# Patient Record
Sex: FEMALE | Race: OTHER | NOT HISPANIC OR LATINO | URBAN - METROPOLITAN AREA
[De-identification: names, ages, dates, MRNs, and addresses within clinical notes are randomized per-mention and may not be internally consistent; named-entity substitution may affect disease eponyms.]

---

## 2014-02-17 RX ORDER — INSULIN GLARGINE 100 [IU]/ML
20 INJECTION, SOLUTION SUBCUTANEOUS
Qty: 0 | Refills: 0 | COMMUNITY
Start: 2014-02-17

## 2017-06-01 ENCOUNTER — OUTPATIENT (OUTPATIENT)
Dept: OUTPATIENT SERVICES | Facility: HOSPITAL | Age: 57
LOS: 1 days | End: 2017-06-01
Payer: MEDICAID

## 2017-06-26 ENCOUNTER — INPATIENT (INPATIENT)
Facility: HOSPITAL | Age: 57
LOS: 3 days | Discharge: HOME | End: 2017-06-30
Attending: INTERNAL MEDICINE | Admitting: INTERNAL MEDICINE
Payer: MEDICAID

## 2017-06-26 VITALS
OXYGEN SATURATION: 98 % | RESPIRATION RATE: 24 BRPM | SYSTOLIC BLOOD PRESSURE: 190 MMHG | DIASTOLIC BLOOD PRESSURE: 76 MMHG | TEMPERATURE: 99 F | HEART RATE: 73 BPM

## 2017-06-26 DIAGNOSIS — R06.02 SHORTNESS OF BREATH: ICD-10-CM

## 2017-06-26 LAB
ALBUMIN SERPL ELPH-MCNC: 3.1 G/DL — LOW (ref 3.3–5)
ALP SERPL-CCNC: 69 U/L — SIGNIFICANT CHANGE UP (ref 40–120)
ALT FLD-CCNC: 37 U/L — HIGH (ref 4–33)
APPEARANCE UR: CLEAR — SIGNIFICANT CHANGE UP
AST SERPL-CCNC: 17 U/L — SIGNIFICANT CHANGE UP (ref 4–32)
BASE EXCESS BLDV CALC-SCNC: -7.1 MMOL/L — SIGNIFICANT CHANGE UP
BASOPHILS # BLD AUTO: 0.02 K/UL — SIGNIFICANT CHANGE UP (ref 0–0.2)
BASOPHILS NFR BLD AUTO: 0.2 % — SIGNIFICANT CHANGE UP (ref 0–2)
BILIRUB SERPL-MCNC: 0.3 MG/DL — SIGNIFICANT CHANGE UP (ref 0.2–1.2)
BILIRUB UR-MCNC: NEGATIVE — SIGNIFICANT CHANGE UP
BLOOD GAS VENOUS - CREATININE: 2.79 MG/DL — HIGH (ref 0.5–1.3)
BLOOD UR QL VISUAL: NEGATIVE — SIGNIFICANT CHANGE UP
BUN SERPL-MCNC: 39 MG/DL — HIGH (ref 7–23)
BUN SERPL-MCNC: 40 MG/DL — HIGH (ref 7–23)
CALCIUM SERPL-MCNC: 8.1 MG/DL — LOW (ref 8.4–10.5)
CALCIUM SERPL-MCNC: 8.1 MG/DL — LOW (ref 8.4–10.5)
CHLORIDE BLDV-SCNC: 114 MMOL/L — HIGH (ref 96–108)
CHLORIDE SERPL-SCNC: 108 MMOL/L — HIGH (ref 98–107)
CHLORIDE SERPL-SCNC: 110 MMOL/L — HIGH (ref 98–107)
CK MB BLD-MCNC: 2.2 NG/ML — SIGNIFICANT CHANGE UP (ref 1–4.7)
CK MB BLD-MCNC: 2.44 NG/ML — SIGNIFICANT CHANGE UP (ref 1–4.7)
CK SERPL-CCNC: 72 U/L — SIGNIFICANT CHANGE UP (ref 25–170)
CK SERPL-CCNC: 78 U/L — SIGNIFICANT CHANGE UP (ref 25–170)
CO2 SERPL-SCNC: 16 MMOL/L — LOW (ref 22–31)
CO2 SERPL-SCNC: 16 MMOL/L — LOW (ref 22–31)
COLOR SPEC: SIGNIFICANT CHANGE UP
CREAT SERPL-MCNC: 2.56 MG/DL — HIGH (ref 0.5–1.3)
CREAT SERPL-MCNC: 2.67 MG/DL — HIGH (ref 0.5–1.3)
EOSINOPHIL # BLD AUTO: 0.25 K/UL — SIGNIFICANT CHANGE UP (ref 0–0.5)
EOSINOPHIL NFR BLD AUTO: 2.5 % — SIGNIFICANT CHANGE UP (ref 0–6)
GAS PNL BLDV: 136 MMOL/L — SIGNIFICANT CHANGE UP (ref 136–146)
GLUCOSE BLDV-MCNC: 78 — SIGNIFICANT CHANGE UP (ref 70–99)
GLUCOSE SERPL-MCNC: 153 MG/DL — HIGH (ref 70–99)
GLUCOSE SERPL-MCNC: 83 MG/DL — SIGNIFICANT CHANGE UP (ref 70–99)
GLUCOSE UR-MCNC: NEGATIVE — SIGNIFICANT CHANGE UP
HCO3 BLDV-SCNC: 18 MMOL/L — LOW (ref 20–27)
HCT VFR BLD CALC: 30.5 % — LOW (ref 34.5–45)
HCT VFR BLDV CALC: 30.2 % — LOW (ref 34.5–45)
HGB BLD-MCNC: 9.3 G/DL — LOW (ref 11.5–15.5)
HGB BLDV-MCNC: 9.8 G/DL — LOW (ref 11.5–15.5)
IMM GRANULOCYTES NFR BLD AUTO: 0.2 % — SIGNIFICANT CHANGE UP (ref 0–1.5)
KETONES UR-MCNC: NEGATIVE — SIGNIFICANT CHANGE UP
LACTATE BLDV-MCNC: 0.7 MMOL/L — SIGNIFICANT CHANGE UP (ref 0.5–2)
LEUKOCYTE ESTERASE UR-ACNC: NEGATIVE — SIGNIFICANT CHANGE UP
LYMPHOCYTES # BLD AUTO: 2.04 K/UL — SIGNIFICANT CHANGE UP (ref 1–3.3)
LYMPHOCYTES # BLD AUTO: 20.6 % — SIGNIFICANT CHANGE UP (ref 13–44)
MCHC RBC-ENTMCNC: 27.8 PG — SIGNIFICANT CHANGE UP (ref 27–34)
MCHC RBC-ENTMCNC: 30.5 % — LOW (ref 32–36)
MCV RBC AUTO: 91 FL — SIGNIFICANT CHANGE UP (ref 80–100)
MONOCYTES # BLD AUTO: 0.99 K/UL — HIGH (ref 0–0.9)
MONOCYTES NFR BLD AUTO: 10 % — SIGNIFICANT CHANGE UP (ref 2–14)
MUCOUS THREADS # UR AUTO: SIGNIFICANT CHANGE UP
NEUTROPHILS # BLD AUTO: 6.59 K/UL — SIGNIFICANT CHANGE UP (ref 1.8–7.4)
NEUTROPHILS NFR BLD AUTO: 66.5 % — SIGNIFICANT CHANGE UP (ref 43–77)
NITRITE UR-MCNC: NEGATIVE — SIGNIFICANT CHANGE UP
NT-PROBNP SERPL-SCNC: 1732 PG/ML — SIGNIFICANT CHANGE UP
PCO2 BLDV: 38 MMHG — LOW (ref 41–51)
PH BLDV: 7.3 PH — LOW (ref 7.32–7.43)
PH UR: 6 — SIGNIFICANT CHANGE UP (ref 4.6–8)
PLATELET # BLD AUTO: 178 K/UL — SIGNIFICANT CHANGE UP (ref 150–400)
PMV BLD: 12.3 FL — SIGNIFICANT CHANGE UP (ref 7–13)
PO2 BLDV: 59 MMHG — HIGH (ref 35–40)
POTASSIUM BLDV-SCNC: 5.5 MMOL/L — HIGH (ref 3.4–4.5)
POTASSIUM SERPL-MCNC: 5.5 MMOL/L — HIGH (ref 3.5–5.3)
POTASSIUM SERPL-MCNC: 5.9 MMOL/L — HIGH (ref 3.5–5.3)
POTASSIUM SERPL-SCNC: 5.5 MMOL/L — HIGH (ref 3.5–5.3)
POTASSIUM SERPL-SCNC: 5.9 MMOL/L — HIGH (ref 3.5–5.3)
PROT SERPL-MCNC: 5.9 G/DL — LOW (ref 6–8.3)
PROT UR-MCNC: 150 — HIGH
RBC # BLD: 3.35 M/UL — LOW (ref 3.8–5.2)
RBC # FLD: 14.8 % — HIGH (ref 10.3–14.5)
RBC CASTS # UR COMP ASSIST: SIGNIFICANT CHANGE UP (ref 0–?)
SAO2 % BLDV: 88.9 % — HIGH (ref 60–85)
SODIUM SERPL-SCNC: 140 MMOL/L — SIGNIFICANT CHANGE UP (ref 135–145)
SODIUM SERPL-SCNC: 140 MMOL/L — SIGNIFICANT CHANGE UP (ref 135–145)
SP GR SPEC: 1.01 — SIGNIFICANT CHANGE UP (ref 1–1.03)
SQUAMOUS # UR AUTO: SIGNIFICANT CHANGE UP
TROPONIN T SERPL-MCNC: < 0.06 NG/ML — SIGNIFICANT CHANGE UP (ref 0–0.06)
TROPONIN T SERPL-MCNC: < 0.06 NG/ML — SIGNIFICANT CHANGE UP (ref 0–0.06)
UROBILINOGEN FLD QL: NORMAL E.U. — SIGNIFICANT CHANGE UP (ref 0.1–0.2)
WBC # BLD: 9.91 K/UL — SIGNIFICANT CHANGE UP (ref 3.8–10.5)
WBC # FLD AUTO: 9.91 K/UL — SIGNIFICANT CHANGE UP (ref 3.8–10.5)
WBC UR QL: SIGNIFICANT CHANGE UP (ref 0–?)

## 2017-06-26 PROCEDURE — 71020: CPT | Mod: 26

## 2017-06-26 RX ORDER — IPRATROPIUM/ALBUTEROL SULFATE 18-103MCG
3 AEROSOL WITH ADAPTER (GRAM) INHALATION ONCE
Qty: 0 | Refills: 0 | Status: COMPLETED | OUTPATIENT
Start: 2017-06-26 | End: 2017-06-26

## 2017-06-26 RX ORDER — NITROGLYCERIN 6.5 MG
1 CAPSULE, EXTENDED RELEASE ORAL ONCE
Qty: 0 | Refills: 0 | Status: COMPLETED | OUTPATIENT
Start: 2017-06-26 | End: 2017-06-26

## 2017-06-26 RX ORDER — HYDRALAZINE HCL 50 MG
25 TABLET ORAL ONCE
Qty: 0 | Refills: 0 | Status: COMPLETED | OUTPATIENT
Start: 2017-06-26 | End: 2017-06-26

## 2017-06-26 RX ORDER — SODIUM BICARBONATE 1 MEQ/ML
50 SYRINGE (ML) INTRAVENOUS ONCE
Qty: 0 | Refills: 0 | Status: COMPLETED | OUTPATIENT
Start: 2017-06-26 | End: 2017-06-26

## 2017-06-26 RX ORDER — DEXTROSE 50 % IN WATER 50 %
50 SYRINGE (ML) INTRAVENOUS ONCE
Qty: 0 | Refills: 0 | Status: COMPLETED | OUTPATIENT
Start: 2017-06-26 | End: 2017-06-26

## 2017-06-26 RX ORDER — HYDRALAZINE HCL 50 MG
10 TABLET ORAL ONCE
Qty: 0 | Refills: 0 | Status: COMPLETED | OUTPATIENT
Start: 2017-06-26 | End: 2017-06-26

## 2017-06-26 RX ORDER — NITROGLYCERIN 6.5 MG
0.4 CAPSULE, EXTENDED RELEASE ORAL ONCE
Qty: 0 | Refills: 0 | Status: COMPLETED | OUTPATIENT
Start: 2017-06-26 | End: 2017-06-26

## 2017-06-26 RX ORDER — NITROGLYCERIN 6.5 MG
0.4 CAPSULE, EXTENDED RELEASE ORAL
Qty: 0 | Refills: 0 | Status: COMPLETED | OUTPATIENT
Start: 2017-06-26 | End: 2017-06-26

## 2017-06-26 RX ORDER — CARVEDILOL PHOSPHATE 80 MG/1
25 CAPSULE, EXTENDED RELEASE ORAL EVERY 12 HOURS
Qty: 0 | Refills: 0 | Status: DISCONTINUED | OUTPATIENT
Start: 2017-06-26 | End: 2017-06-27

## 2017-06-26 RX ORDER — NITROGLYCERIN 6.5 MG
0.3 CAPSULE, EXTENDED RELEASE ORAL
Qty: 0 | Refills: 0 | Status: DISCONTINUED | OUTPATIENT
Start: 2017-06-26 | End: 2017-06-26

## 2017-06-26 RX ORDER — INSULIN HUMAN 100 [IU]/ML
10 INJECTION, SOLUTION SUBCUTANEOUS ONCE
Qty: 0 | Refills: 0 | Status: COMPLETED | OUTPATIENT
Start: 2017-06-26 | End: 2017-06-26

## 2017-06-26 RX ADMIN — Medication 3 MILLILITER(S): at 15:52

## 2017-06-26 RX ADMIN — Medication 10 MILLIGRAM(S): at 19:30

## 2017-06-26 RX ADMIN — INSULIN HUMAN 10 UNIT(S): 100 INJECTION, SOLUTION SUBCUTANEOUS at 18:55

## 2017-06-26 RX ADMIN — Medication 40 MILLIGRAM(S): at 15:52

## 2017-06-26 RX ADMIN — Medication 3 MILLILITER(S): at 19:26

## 2017-06-26 RX ADMIN — Medication 0.4 MILLIGRAM(S): at 20:10

## 2017-06-26 RX ADMIN — Medication 50 MILLILITER(S): at 18:55

## 2017-06-26 RX ADMIN — Medication 0.4 MILLIGRAM(S): at 18:54

## 2017-06-26 RX ADMIN — Medication 0.4 MILLIGRAM(S): at 20:00

## 2017-06-26 RX ADMIN — Medication 1 INCH(S): at 20:33

## 2017-06-26 RX ADMIN — Medication 20 MILLIGRAM(S): at 19:30

## 2017-06-26 RX ADMIN — Medication 0.4 MILLIGRAM(S): at 21:25

## 2017-06-26 NOTE — ED PROVIDER NOTE - OBJECTIVE STATEMENT
56F h/o HTN, HLD, DM, asthma (no intubations) p/w SOB, weakness, productive cough (yellow sputum) x 2 days. +subjective fever yesterday. +chills. SOB worse with exertion. +LE edema. Denies CP, nausea, vomiting, dysuria, hematuria, hemoptysis, sick contacts, recent travel.   Pt from [facility] [place].

## 2017-06-26 NOTE — ED PROVIDER NOTE - ATTENDING CONTRIBUTION TO CARE
LE[facility] Attending Note - Dr. Friedman  56F h/o HTN, HLD, DM, asthma (no intubations) from [facility] with c/c SOB, weakness, productive cough (yellow sputum) x 2 days. +subjective fever yesterday. +chills. SOB worse with exertion. +LE edema. Pt sleeps on one pillow.  PE: pt is alert and oriented, perrl, ent normal, membranes are moist, neck supple. no lymphadenopathy or thyroid enlargement, No JVD.  Chest bilateral diffuse inspiratory and expiratory wheezing..  Heart- reg rhythm without murmur, rubs or gallops, radial pulses equal bilaterally.  Abd is soft, non-tender, Bowel sounds are active. no mass or organomegaly. : No CVA tenderness. Neuro:  Pt alert and oriented x 3. Perrl    Distal neurosensory is intact. Motor function is 5/5 strength bilaterally.  No focal deficits. Extremities:  bilateral  edema.  Skin: warm and dry.  Plan:  Labs including BNP, CXR, EKG, Cardiac enzymes to R/O asthma, CAD, or CHF. and reassessment. If patient is improved with duonebs and feels close to baseling. Pt can be discharged, If pneumonia or CHF are present Pt should be admitted.

## 2017-06-26 NOTE — ED ADULT NURSE NOTE - CHIEF COMPLAINT
The patient is a 56y Female complaining of difficulty breathing, wheezing, coughing x 3-4 days.  Pt used nebulizer treatment yesterday without relief. Coughing with yellowish sputum.

## 2017-06-26 NOTE — ED ADULT NURSE NOTE - OBJECTIVE STATEMENT
Alert, awake, oriented x3.  Wheezing bilaterally.  BP noted elevated. Alert, awake, oriented x3.  Wheezing bilaterally.  BP noted elevated.  MD Saenz made aware.  Pain relieved after 3 dose of Nitro SL, and nitro past, pt reports relief of pain.  Admitted to Telemetry.

## 2017-06-26 NOTE — ED ADULT NURSE REASSESSMENT NOTE - NS ED NURSE REASSESS COMMENT FT1
Pt is on droplet precaution, RVP sent. 2nd CE sent. Pt resting stable without angina.  Handoff given to MARIELLA Bowman in [facility].

## 2017-06-26 NOTE — ED PROVIDER NOTE - PMH
Asthma    Asthma    Diabetes mellitus    DM (diabetes mellitus)    HTN (hypertension)    Hyperlipidemia    Hyperlipidemia    Hypertension    Kidney disease due to severe high blood pressure, stage 1-4 or unspecified chronic kidney disease

## 2017-06-26 NOTE — ED PROVIDER NOTE - PROGRESS NOTE DETAILS
Fred:  Called to see patient for chest pain.  Repeated ECG which did not sow evidence for STEMI.  Pt was given sublingual nitroglycerin and stated her breathing and chest pain improved.

## 2017-06-26 NOTE — ED ADULT TRIAGE NOTE - CHIEF COMPLAINT QUOTE
Pt c/o increasing SOB and ankle and feet swelling--worse upon excertion since saturday--pt also states she has asthma and has felt some wheezing since saturday

## 2017-06-27 DIAGNOSIS — Z29.9 ENCOUNTER FOR PROPHYLACTIC MEASURES, UNSPECIFIED: ICD-10-CM

## 2017-06-27 DIAGNOSIS — R07.9 CHEST PAIN, UNSPECIFIED: ICD-10-CM

## 2017-06-27 DIAGNOSIS — E87.2 ACIDOSIS: ICD-10-CM

## 2017-06-27 DIAGNOSIS — E11.9 TYPE 2 DIABETES MELLITUS WITHOUT COMPLICATIONS: ICD-10-CM

## 2017-06-27 DIAGNOSIS — E83.42 HYPOMAGNESEMIA: ICD-10-CM

## 2017-06-27 DIAGNOSIS — E87.5 HYPERKALEMIA: ICD-10-CM

## 2017-06-27 DIAGNOSIS — N18.9 CHRONIC KIDNEY DISEASE, UNSPECIFIED: ICD-10-CM

## 2017-06-27 DIAGNOSIS — J45.901 UNSPECIFIED ASTHMA WITH (ACUTE) EXACERBATION: ICD-10-CM

## 2017-06-27 DIAGNOSIS — J45.909 UNSPECIFIED ASTHMA, UNCOMPLICATED: ICD-10-CM

## 2017-06-27 DIAGNOSIS — I10 ESSENTIAL (PRIMARY) HYPERTENSION: ICD-10-CM

## 2017-06-27 LAB
B PERT DNA SPEC QL NAA+PROBE: SIGNIFICANT CHANGE UP
BASE EXCESS BLDV CALC-SCNC: -5.1 MMOL/L — SIGNIFICANT CHANGE UP
BASE EXCESS BLDV CALC-SCNC: -6.6 MMOL/L — SIGNIFICANT CHANGE UP
BASOPHILS # BLD AUTO: 0.01 K/UL — SIGNIFICANT CHANGE UP (ref 0–0.2)
BASOPHILS # BLD AUTO: 0.01 K/UL — SIGNIFICANT CHANGE UP (ref 0–0.2)
BASOPHILS NFR BLD AUTO: 0.1 % — SIGNIFICANT CHANGE UP (ref 0–2)
BASOPHILS NFR BLD AUTO: 0.1 % — SIGNIFICANT CHANGE UP (ref 0–2)
BLOOD GAS VENOUS - CREATININE: 2.03 MG/DL — HIGH (ref 0.5–1.3)
BLOOD GAS VENOUS - CREATININE: 2.13 MG/DL — HIGH (ref 0.5–1.3)
BUN SERPL-MCNC: 36 MG/DL — HIGH (ref 7–23)
BUN SERPL-MCNC: 37 MG/DL — HIGH (ref 7–23)
C PNEUM DNA SPEC QL NAA+PROBE: NOT DETECTED — SIGNIFICANT CHANGE UP
CALCIUM SERPL-MCNC: 8.3 MG/DL — LOW (ref 8.4–10.5)
CALCIUM SERPL-MCNC: 8.8 MG/DL — SIGNIFICANT CHANGE UP (ref 8.4–10.5)
CHLORIDE BLDV-SCNC: 109 MMOL/L — HIGH (ref 96–108)
CHLORIDE BLDV-SCNC: 111 MMOL/L — HIGH (ref 96–108)
CHLORIDE SERPL-SCNC: 104 MMOL/L — SIGNIFICANT CHANGE UP (ref 98–107)
CHLORIDE SERPL-SCNC: 104 MMOL/L — SIGNIFICANT CHANGE UP (ref 98–107)
CHOLEST SERPL-MCNC: 161 MG/DL — SIGNIFICANT CHANGE UP (ref 120–199)
CO2 SERPL-SCNC: 18 MMOL/L — LOW (ref 22–31)
CO2 SERPL-SCNC: 18 MMOL/L — LOW (ref 22–31)
CREAT SERPL-MCNC: 2.34 MG/DL — HIGH (ref 0.5–1.3)
CREAT SERPL-MCNC: 2.39 MG/DL — HIGH (ref 0.5–1.3)
EOSINOPHIL # BLD AUTO: 0 K/UL — SIGNIFICANT CHANGE UP (ref 0–0.5)
EOSINOPHIL # BLD AUTO: 0 K/UL — SIGNIFICANT CHANGE UP (ref 0–0.5)
EOSINOPHIL NFR BLD AUTO: 0 % — SIGNIFICANT CHANGE UP (ref 0–6)
EOSINOPHIL NFR BLD AUTO: 0 % — SIGNIFICANT CHANGE UP (ref 0–6)
FLUAV H1 2009 PAND RNA SPEC QL NAA+PROBE: NOT DETECTED — SIGNIFICANT CHANGE UP
FLUAV H1 RNA SPEC QL NAA+PROBE: NOT DETECTED — SIGNIFICANT CHANGE UP
FLUAV H3 RNA SPEC QL NAA+PROBE: NOT DETECTED — SIGNIFICANT CHANGE UP
FLUAV SUBTYP SPEC NAA+PROBE: SIGNIFICANT CHANGE UP
FLUBV RNA SPEC QL NAA+PROBE: NOT DETECTED — SIGNIFICANT CHANGE UP
GAS PNL BLDV: 132 MMOL/L — LOW (ref 136–146)
GAS PNL BLDV: 133 MMOL/L — LOW (ref 136–146)
GLUCOSE BLDV-MCNC: 160 — HIGH (ref 70–99)
GLUCOSE BLDV-MCNC: 225 — HIGH (ref 70–99)
GLUCOSE SERPL-MCNC: 162 MG/DL — HIGH (ref 70–99)
GLUCOSE SERPL-MCNC: 237 MG/DL — HIGH (ref 70–99)
HADV DNA SPEC QL NAA+PROBE: NOT DETECTED — SIGNIFICANT CHANGE UP
HBA1C BLD-MCNC: 7.1 % — HIGH (ref 4–5.6)
HCO3 BLDV-SCNC: 19 MMOL/L — LOW (ref 20–27)
HCO3 BLDV-SCNC: 19 MMOL/L — LOW (ref 20–27)
HCOV 229E RNA SPEC QL NAA+PROBE: NOT DETECTED — SIGNIFICANT CHANGE UP
HCOV HKU1 RNA SPEC QL NAA+PROBE: NOT DETECTED — SIGNIFICANT CHANGE UP
HCOV NL63 RNA SPEC QL NAA+PROBE: NOT DETECTED — SIGNIFICANT CHANGE UP
HCOV OC43 RNA SPEC QL NAA+PROBE: NOT DETECTED — SIGNIFICANT CHANGE UP
HCT VFR BLD CALC: 31.1 % — LOW (ref 34.5–45)
HCT VFR BLD CALC: 33.6 % — LOW (ref 34.5–45)
HCT VFR BLDV CALC: 30.4 % — LOW (ref 34.5–45)
HCT VFR BLDV CALC: 32.7 % — LOW (ref 34.5–45)
HDLC SERPL-MCNC: 51 MG/DL — SIGNIFICANT CHANGE UP (ref 45–65)
HGB BLD-MCNC: 10.6 G/DL — LOW (ref 11.5–15.5)
HGB BLD-MCNC: 9.9 G/DL — LOW (ref 11.5–15.5)
HGB BLDV-MCNC: 10.6 G/DL — LOW (ref 11.5–15.5)
HGB BLDV-MCNC: 9.8 G/DL — LOW (ref 11.5–15.5)
HMPV RNA SPEC QL NAA+PROBE: NOT DETECTED — SIGNIFICANT CHANGE UP
HPIV1 RNA SPEC QL NAA+PROBE: NOT DETECTED — SIGNIFICANT CHANGE UP
HPIV2 RNA SPEC QL NAA+PROBE: NOT DETECTED — SIGNIFICANT CHANGE UP
HPIV3 RNA SPEC QL NAA+PROBE: NOT DETECTED — SIGNIFICANT CHANGE UP
HPIV4 RNA SPEC QL NAA+PROBE: NOT DETECTED — SIGNIFICANT CHANGE UP
IMM GRANULOCYTES NFR BLD AUTO: 0.2 % — SIGNIFICANT CHANGE UP (ref 0–1.5)
IMM GRANULOCYTES NFR BLD AUTO: 0.4 % — SIGNIFICANT CHANGE UP (ref 0–1.5)
LACTATE BLDV-MCNC: 0.6 MMOL/L — SIGNIFICANT CHANGE UP (ref 0.5–2)
LACTATE BLDV-MCNC: 1 MMOL/L — SIGNIFICANT CHANGE UP (ref 0.5–2)
LIPID PNL WITH DIRECT LDL SERPL: 98 MG/DL — SIGNIFICANT CHANGE UP
LYMPHOCYTES # BLD AUTO: 0.95 K/UL — LOW (ref 1–3.3)
LYMPHOCYTES # BLD AUTO: 1.25 K/UL — SIGNIFICANT CHANGE UP (ref 1–3.3)
LYMPHOCYTES # BLD AUTO: 12.8 % — LOW (ref 13–44)
LYMPHOCYTES # BLD AUTO: 15.2 % — SIGNIFICANT CHANGE UP (ref 13–44)
M PNEUMO DNA SPEC QL NAA+PROBE: NOT DETECTED — SIGNIFICANT CHANGE UP
MAGNESIUM SERPL-MCNC: 1.1 MG/DL — LOW (ref 1.6–2.6)
MCHC RBC-ENTMCNC: 28.3 PG — SIGNIFICANT CHANGE UP (ref 27–34)
MCHC RBC-ENTMCNC: 28.4 PG — SIGNIFICANT CHANGE UP (ref 27–34)
MCHC RBC-ENTMCNC: 31.5 % — LOW (ref 32–36)
MCHC RBC-ENTMCNC: 31.8 % — LOW (ref 32–36)
MCV RBC AUTO: 89.4 FL — SIGNIFICANT CHANGE UP (ref 80–100)
MCV RBC AUTO: 89.6 FL — SIGNIFICANT CHANGE UP (ref 80–100)
MONOCYTES # BLD AUTO: 0.11 K/UL — SIGNIFICANT CHANGE UP (ref 0–0.9)
MONOCYTES # BLD AUTO: 0.16 K/UL — SIGNIFICANT CHANGE UP (ref 0–0.9)
MONOCYTES NFR BLD AUTO: 1.5 % — LOW (ref 2–14)
MONOCYTES NFR BLD AUTO: 1.9 % — LOW (ref 2–14)
NEUTROPHILS # BLD AUTO: 6.3 K/UL — SIGNIFICANT CHANGE UP (ref 1.8–7.4)
NEUTROPHILS # BLD AUTO: 6.78 K/UL — SIGNIFICANT CHANGE UP (ref 1.8–7.4)
NEUTROPHILS NFR BLD AUTO: 82.6 % — HIGH (ref 43–77)
NEUTROPHILS NFR BLD AUTO: 85.2 % — HIGH (ref 43–77)
PCO2 BLDV: 35 MMHG — LOW (ref 41–51)
PCO2 BLDV: 42 MMHG — SIGNIFICANT CHANGE UP (ref 41–51)
PH BLDV: 7.3 PH — LOW (ref 7.32–7.43)
PH BLDV: 7.34 PH — SIGNIFICANT CHANGE UP (ref 7.32–7.43)
PHOSPHATE SERPL-MCNC: 3.8 MG/DL — SIGNIFICANT CHANGE UP (ref 2.5–4.5)
PLATELET # BLD AUTO: 166 K/UL — SIGNIFICANT CHANGE UP (ref 150–400)
PLATELET # BLD AUTO: 194 K/UL — SIGNIFICANT CHANGE UP (ref 150–400)
PMV BLD: 12.7 FL — SIGNIFICANT CHANGE UP (ref 7–13)
PMV BLD: 12.7 FL — SIGNIFICANT CHANGE UP (ref 7–13)
PO2 BLDV: 34 MMHG — LOW (ref 35–40)
PO2 BLDV: 82 MMHG — HIGH (ref 35–40)
POTASSIUM BLDV-SCNC: 5.3 MMOL/L — HIGH (ref 3.4–4.5)
POTASSIUM BLDV-SCNC: 5.5 MMOL/L — HIGH (ref 3.4–4.5)
POTASSIUM SERPL-MCNC: 5.7 MMOL/L — HIGH (ref 3.5–5.3)
POTASSIUM SERPL-MCNC: 5.7 MMOL/L — HIGH (ref 3.5–5.3)
POTASSIUM SERPL-SCNC: 5.7 MMOL/L — HIGH (ref 3.5–5.3)
POTASSIUM SERPL-SCNC: 5.7 MMOL/L — HIGH (ref 3.5–5.3)
RBC # BLD: 3.48 M/UL — LOW (ref 3.8–5.2)
RBC # BLD: 3.75 M/UL — LOW (ref 3.8–5.2)
RBC # FLD: 14.3 % — SIGNIFICANT CHANGE UP (ref 10.3–14.5)
RBC # FLD: 14.5 % — SIGNIFICANT CHANGE UP (ref 10.3–14.5)
RSV RNA SPEC QL NAA+PROBE: NOT DETECTED — SIGNIFICANT CHANGE UP
RV+EV RNA SPEC QL NAA+PROBE: NOT DETECTED — SIGNIFICANT CHANGE UP
SAO2 % BLDV: 59.3 % — LOW (ref 60–85)
SAO2 % BLDV: 96.6 % — HIGH (ref 60–85)
SODIUM SERPL-SCNC: 136 MMOL/L — SIGNIFICANT CHANGE UP (ref 135–145)
SODIUM SERPL-SCNC: 137 MMOL/L — SIGNIFICANT CHANGE UP (ref 135–145)
TRIGL SERPL-MCNC: 88 MG/DL — SIGNIFICANT CHANGE UP (ref 10–149)
TSH SERPL-MCNC: 0.89 UIU/ML — SIGNIFICANT CHANGE UP (ref 0.27–4.2)
WBC # BLD: 7.4 K/UL — SIGNIFICANT CHANGE UP (ref 3.8–10.5)
WBC # BLD: 8.22 K/UL — SIGNIFICANT CHANGE UP (ref 3.8–10.5)
WBC # FLD AUTO: 7.4 K/UL — SIGNIFICANT CHANGE UP (ref 3.8–10.5)
WBC # FLD AUTO: 8.22 K/UL — SIGNIFICANT CHANGE UP (ref 3.8–10.5)

## 2017-06-27 PROCEDURE — 12345: CPT | Mod: NC

## 2017-06-27 PROCEDURE — 99223 1ST HOSP IP/OBS HIGH 75: CPT | Mod: GC

## 2017-06-27 RX ORDER — CALCIUM GLUCONATE 100 MG/ML
1 VIAL (ML) INTRAVENOUS ONCE
Qty: 1 | Refills: 0 | Status: COMPLETED | OUTPATIENT
Start: 2017-06-27 | End: 2017-06-27

## 2017-06-27 RX ORDER — INSULIN LISPRO 100/ML
VIAL (ML) SUBCUTANEOUS
Qty: 0 | Refills: 0 | Status: DISCONTINUED | OUTPATIENT
Start: 2017-06-27 | End: 2017-06-30

## 2017-06-27 RX ORDER — DEXTROSE 50 % IN WATER 50 %
25 SYRINGE (ML) INTRAVENOUS ONCE
Qty: 0 | Refills: 0 | Status: DISCONTINUED | OUTPATIENT
Start: 2017-06-27 | End: 2017-06-30

## 2017-06-27 RX ORDER — ASPIRIN/CALCIUM CARB/MAGNESIUM 324 MG
81 TABLET ORAL DAILY
Qty: 0 | Refills: 0 | Status: DISCONTINUED | OUTPATIENT
Start: 2017-06-27 | End: 2017-06-30

## 2017-06-27 RX ORDER — CARVEDILOL PHOSPHATE 80 MG/1
25 CAPSULE, EXTENDED RELEASE ORAL EVERY 12 HOURS
Qty: 0 | Refills: 0 | Status: DISCONTINUED | OUTPATIENT
Start: 2017-06-27 | End: 2017-06-30

## 2017-06-27 RX ORDER — SODIUM BICARBONATE 1 MEQ/ML
1300 SYRINGE (ML) INTRAVENOUS ONCE
Qty: 0 | Refills: 0 | Status: COMPLETED | OUTPATIENT
Start: 2017-06-27 | End: 2017-06-27

## 2017-06-27 RX ORDER — INSULIN GLARGINE 100 [IU]/ML
12 INJECTION, SOLUTION SUBCUTANEOUS
Qty: 0 | Refills: 0 | Status: DISCONTINUED | OUTPATIENT
Start: 2017-06-27 | End: 2017-06-28

## 2017-06-27 RX ORDER — IPRATROPIUM/ALBUTEROL SULFATE 18-103MCG
3 AEROSOL WITH ADAPTER (GRAM) INHALATION EVERY 6 HOURS
Qty: 0 | Refills: 0 | Status: DISCONTINUED | OUTPATIENT
Start: 2017-06-27 | End: 2017-06-30

## 2017-06-27 RX ORDER — SODIUM CHLORIDE 9 MG/ML
1000 INJECTION INTRAMUSCULAR; INTRAVENOUS; SUBCUTANEOUS
Qty: 0 | Refills: 0 | Status: DISCONTINUED | OUTPATIENT
Start: 2017-06-27 | End: 2017-06-27

## 2017-06-27 RX ORDER — SODIUM BICARBONATE 1 MEQ/ML
650 SYRINGE (ML) INTRAVENOUS EVERY 12 HOURS
Qty: 0 | Refills: 0 | Status: DISCONTINUED | OUTPATIENT
Start: 2017-06-27 | End: 2017-06-30

## 2017-06-27 RX ORDER — DEXTROSE 50 % IN WATER 50 %
1 SYRINGE (ML) INTRAVENOUS ONCE
Qty: 0 | Refills: 0 | Status: DISCONTINUED | OUTPATIENT
Start: 2017-06-27 | End: 2017-06-30

## 2017-06-27 RX ORDER — SODIUM CHLORIDE 9 MG/ML
1000 INJECTION, SOLUTION INTRAVENOUS
Qty: 0 | Refills: 0 | Status: DISCONTINUED | OUTPATIENT
Start: 2017-06-27 | End: 2017-06-30

## 2017-06-27 RX ORDER — HEPARIN SODIUM 5000 [USP'U]/ML
5000 INJECTION INTRAVENOUS; SUBCUTANEOUS EVERY 8 HOURS
Qty: 0 | Refills: 0 | Status: DISCONTINUED | OUTPATIENT
Start: 2017-06-27 | End: 2017-06-30

## 2017-06-27 RX ORDER — SODIUM POLYSTYRENE SULFONATE 4.1 MEQ/G
30 POWDER, FOR SUSPENSION ORAL ONCE
Qty: 0 | Refills: 0 | Status: COMPLETED | OUTPATIENT
Start: 2017-06-27 | End: 2017-06-27

## 2017-06-27 RX ORDER — INSULIN LISPRO 100/ML
VIAL (ML) SUBCUTANEOUS AT BEDTIME
Qty: 0 | Refills: 0 | Status: DISCONTINUED | OUTPATIENT
Start: 2017-06-27 | End: 2017-06-30

## 2017-06-27 RX ORDER — PANTOPRAZOLE SODIUM 20 MG/1
40 TABLET, DELAYED RELEASE ORAL
Qty: 0 | Refills: 0 | Status: DISCONTINUED | OUTPATIENT
Start: 2017-06-27 | End: 2017-06-30

## 2017-06-27 RX ORDER — MAGNESIUM SULFATE 500 MG/ML
2 VIAL (ML) INJECTION ONCE
Qty: 0 | Refills: 0 | Status: COMPLETED | OUTPATIENT
Start: 2017-06-27 | End: 2017-06-27

## 2017-06-27 RX ORDER — GLUCAGON INJECTION, SOLUTION 0.5 MG/.1ML
1 INJECTION, SOLUTION SUBCUTANEOUS ONCE
Qty: 0 | Refills: 0 | Status: DISCONTINUED | OUTPATIENT
Start: 2017-06-27 | End: 2017-06-30

## 2017-06-27 RX ORDER — HYDRALAZINE HCL 50 MG
25 TABLET ORAL
Qty: 0 | Refills: 0 | Status: DISCONTINUED | OUTPATIENT
Start: 2017-06-27 | End: 2017-06-28

## 2017-06-27 RX ORDER — DEXTROSE 50 % IN WATER 50 %
12.5 SYRINGE (ML) INTRAVENOUS ONCE
Qty: 0 | Refills: 0 | Status: DISCONTINUED | OUTPATIENT
Start: 2017-06-27 | End: 2017-06-30

## 2017-06-27 RX ORDER — CALCITRIOL 0.5 UG/1
0.25 CAPSULE ORAL DAILY
Qty: 0 | Refills: 0 | Status: DISCONTINUED | OUTPATIENT
Start: 2017-06-27 | End: 2017-06-30

## 2017-06-27 RX ADMIN — Medication 1 INCH(S): at 09:00

## 2017-06-27 RX ADMIN — HEPARIN SODIUM 5000 UNIT(S): 5000 INJECTION INTRAVENOUS; SUBCUTANEOUS at 15:12

## 2017-06-27 RX ADMIN — Medication 650 MILLIGRAM(S): at 18:51

## 2017-06-27 RX ADMIN — Medication 50 GRAM(S): at 16:46

## 2017-06-27 RX ADMIN — Medication 3 MILLILITER(S): at 16:46

## 2017-06-27 RX ADMIN — CARVEDILOL PHOSPHATE 25 MILLIGRAM(S): 80 CAPSULE, EXTENDED RELEASE ORAL at 00:08

## 2017-06-27 RX ADMIN — CARVEDILOL PHOSPHATE 25 MILLIGRAM(S): 80 CAPSULE, EXTENDED RELEASE ORAL at 05:32

## 2017-06-27 RX ADMIN — Medication 200 GRAM(S): at 04:53

## 2017-06-27 RX ADMIN — Medication 81 MILLIGRAM(S): at 13:00

## 2017-06-27 RX ADMIN — HEPARIN SODIUM 5000 UNIT(S): 5000 INJECTION INTRAVENOUS; SUBCUTANEOUS at 21:32

## 2017-06-27 RX ADMIN — SODIUM CHLORIDE 100 MILLILITER(S): 9 INJECTION INTRAMUSCULAR; INTRAVENOUS; SUBCUTANEOUS at 05:26

## 2017-06-27 RX ADMIN — Medication 3 MILLILITER(S): at 10:04

## 2017-06-27 RX ADMIN — Medication 25 MILLIGRAM(S): at 17:52

## 2017-06-27 RX ADMIN — Medication 1: at 09:49

## 2017-06-27 RX ADMIN — Medication 20 MILLIGRAM(S): at 05:32

## 2017-06-27 RX ADMIN — Medication 1300 MILLIGRAM(S): at 04:14

## 2017-06-27 RX ADMIN — Medication 25 MILLIGRAM(S): at 05:32

## 2017-06-27 RX ADMIN — Medication 40 MILLIGRAM(S): at 05:32

## 2017-06-27 RX ADMIN — SODIUM POLYSTYRENE SULFONATE 30 GRAM(S): 4.1 POWDER, FOR SUSPENSION ORAL at 04:53

## 2017-06-27 RX ADMIN — Medication 3 MILLILITER(S): at 21:36

## 2017-06-27 RX ADMIN — INSULIN GLARGINE 12 UNIT(S): 100 INJECTION, SOLUTION SUBCUTANEOUS at 23:46

## 2017-06-27 RX ADMIN — Medication: at 18:50

## 2017-06-27 RX ADMIN — INSULIN GLARGINE 12 UNIT(S): 100 INJECTION, SOLUTION SUBCUTANEOUS at 10:39

## 2017-06-27 RX ADMIN — Medication 2: at 13:01

## 2017-06-27 RX ADMIN — CALCITRIOL 0.25 MICROGRAM(S): 0.5 CAPSULE ORAL at 13:00

## 2017-06-27 RX ADMIN — Medication 25 MILLIGRAM(S): at 00:08

## 2017-06-27 RX ADMIN — Medication 3 MILLILITER(S): at 04:14

## 2017-06-27 RX ADMIN — Medication 650 MILLIGRAM(S): at 05:32

## 2017-06-27 RX ADMIN — CARVEDILOL PHOSPHATE 25 MILLIGRAM(S): 80 CAPSULE, EXTENDED RELEASE ORAL at 17:53

## 2017-06-27 RX ADMIN — PANTOPRAZOLE SODIUM 40 MILLIGRAM(S): 20 TABLET, DELAYED RELEASE ORAL at 06:09

## 2017-06-27 RX ADMIN — HEPARIN SODIUM 5000 UNIT(S): 5000 INJECTION INTRAVENOUS; SUBCUTANEOUS at 05:32

## 2017-06-27 RX ADMIN — Medication 1: at 23:45

## 2017-06-27 NOTE — H&P ADULT - PROBLEM SELECTOR PLAN 3
- Will hold home januvia 100  - Will continue Lantus 12 BID instead of home dose of 20 BID, will titrate as needed  - ISS, and FS  - Hemoglobin A1c pending Stage IV complicated by hyperkalemia and metabolic acidosis   - Will continue to monitor   - Patient was hyperkalemic on admission, will repeat now  - Continue calcitrol 0.25 qd  - Will consult Nephrology in AM (home nephrologist Flip Chauhan)  - Started on bicarb 650 BID

## 2017-06-27 NOTE — CONSULT NOTE ADULT - SUBJECTIVE AND OBJECTIVE BOX
HPI:  55yo Female, ambulatory without assistive devices, hx of HTN, HLD, DM Type2, asthma, CKD presenting with three day history of SOB. Patient reports increased wheezing that started Saturday. Associated symptoms include chills, cough, and yellow sputum production. Denies fevers, nausea, vomiting, palpitations rhinorrhea, sore throat. Reports three loose bowel movements today. No abdominal pain, constipation, melena or hematochezia. Lives at home wit , son, and daughter-in law. No sick contacts or recent travel. Patient reports she had "some" midsternal chest pain, 5/10, which started at rest and resolved after nitroglycerin. Patient reports she has had asthma exacerbations in the past that did not require intubation. She has not had an exacerbation in several years and is not currently on any medications for it. Patient denies headaches or blurry vision.   Of note, the pt says had stress test 2 years ago, which was "normal".    In the ED, T 98.6 -236/64-96 HR 73 RR 24 SpO2 98. Patient was given prednisone 40. Hydralazine 25, hydralazine 10, enalapril 20. Nitroglycerin x3 doses. Duonebs x2. Insulin 10 and dextrose for hyperkalemia on admission. (2017 01:22)      Allergies:  codeine (Other)  No Known Allergies      PAST MEDICAL & SURGICAL HISTORY:  Kidney disease due to severe high blood pressure, stage 1-4 or unspecified chronic kidney disease  Asthma  Hyperlipidemia  DM (diabetes mellitus)  HTN (hypertension)  Asthma  Hyperlipidemia  Hypertension  Diabetes mellitus  No significant past surgical history      Home Medications Reviewed    Hospital Medications:   MEDICATIONS  (STANDING):  heparin  Injectable 5000 Unit(s) SubCutaneous every 8 hours  insulin glargine Injectable (LANTUS) 12 Unit(s) SubCutaneous two times a day  insulin lispro (HumaLOG) corrective regimen sliding scale   SubCutaneous three times a day before meals  insulin lispro (HumaLOG) corrective regimen sliding scale   SubCutaneous at bedtime  dextrose 5%. 1000 milliLiter(s) (50 mL/Hr) IV Continuous <Continuous>  dextrose 50% Injectable 12.5 Gram(s) IV Push once  dextrose 50% Injectable 25 Gram(s) IV Push once  dextrose 50% Injectable 25 Gram(s) IV Push once  ALBUTerol/ipratropium for Nebulization 3 milliLiter(s) Nebulizer every 6 hours  pantoprazole    Tablet 40 milliGRAM(s) Oral before breakfast  hydrALAZINE 25 milliGRAM(s) Oral two times a day  calcitriol   Capsule 0.25 MICROGram(s) Oral daily  predniSONE   Tablet 40 milliGRAM(s) Oral daily  sodium bicarbonate 650 milliGRAM(s) Oral every 12 hours  sodium chloride 0.9%. 1000 milliLiter(s) (100 mL/Hr) IV Continuous <Continuous>  aspirin enteric coated 81 milliGRAM(s) Oral daily  carvedilol 25 milliGRAM(s) Oral every 12 hours      SOCIAL HISTORY:  Denies ETOh, Smoking,     FAMILY HISTORY:  No pertinent family history in first degree relatives      REVIEW OF SYSTEMS:  CONSTITUTIONAL: No weakness, fevers or chills  EYES/ENT: No visual changes;  No vertigo or throat pain   NECK: No pain or stiffness  RESPIRATORY: + shortness of breath, + wheez   CARDIOVASCULAR: + chest pain  GASTROINTESTINAL: No abdominal or epigastric pain. No nausea, vomiting, or hematemesis; No diarrhea or constipation. No melena or hematochezia.  GENITOURINARY: No dysuria, frequency, foamy urine, urinary urgency, incontinence or hematuria  NEUROLOGICAL: No numbness or weakness  SKIN: No itching, burning, rashes, or lesions   VASCULAR: + bilateral lower extremity edema.   All other review of systems is negative unless indicated above.    VITALS:  T(F): 98.5 (17 @ 14:30), Max: 98.8 (17 @ 15:58)  HR: 87 (17 @ 14:30)  BP: 167/81 (17 @ 14:30)  RR: 18 (17 @ 14:30)  SpO2: 98% (17 @ 14:30)  Wt(kg): --        PHYSICAL EXAM:  Constitutional: NAD  HEENT: anicteric sclera, oropharynx clear, MMM  Neck: No JVD  Respiratory: b/l  rhonchi  Cardiovascular: S1, S2, RRR  Gastrointestinal: BS+, soft, NT/ND  Extremities: 2+ edema L>R  Neurological: A/O x 3, no focal deficits  Psychiatric: Normal mood, normal affect  : No CVA tenderness. No diehl.   Skin: No rashes    LABS:      137  |  104  |  36<H>  ----------------------------<  237<H>  5.7<H>   |  18<L>  |  2.34<H>    Ca    8.8      2017 12:05  Phos  3.8       Mg     1.1         TPro  5.9<L>  /  Alb  3.1<L>  /  TBili  0.3  /  DBili      /  AST  17  /  ALT  37<H>  /  AlkPhos  69      Creatinine Trend: 2.34 <--, 2.39 <--, 2.56 <--, 2.67 <--                        10.6   7.40  )-----------( 194      ( 2017 12:05 )             33.6     Urine Studies:  Urinalysis Basic - ( 2017 22:40 )    Color: PLYEL / Appearance: CLEAR / S.013 / pH: 6.0  Gluc: NEGATIVE / Ketone: NEGATIVE  / Bili: NEGATIVE / Urobili: NORMAL E.U.   Blood: NEGATIVE / Protein: 150 / Nitrite: NEGATIVE   Leuk Esterase: NEGATIVE / RBC: 0-2 / WBC 2-5   Sq Epi: OCC / Non Sq Epi:  / Bacteria:           RADIOLOGY & ADDITIONAL STUDIES:

## 2017-06-27 NOTE — CONSULT NOTE ADULT - ASSESSMENT
57yo Female, ambulatory without assistive devices, hx of HTN, HLD, DM Type2, asthma, CKD presenting with three day history of SOB and chest pain

## 2017-06-27 NOTE — CONSULT NOTE ADULT - PROBLEM SELECTOR RECOMMENDATION 9
Acute on CKD vs CKD stage 4, pt does not know baseline, follows dr. mistry, CKD possible sec to DM, baseline could be ~2.1, poor PO for the past week. Jose possible prerenal now improving. pending work up to r/o other etiology  check urine cr, na, eos, ua, renal US  D/C IVF in view of LE edema Acute on CKD vs CKD stage 4, pt does not know baseline, follows dr. mistry, CKD possible sec to DM, baseline could be ~2.1, poor PO for the past week. Jose possible prerenal now improving. pending work up to r/o other etiology  check urine cr, na, eos, ua, renal US  D/C IVF in view of LE edema  check PTH Acute on CKD vs CKD stage 4, pt does not know baseline, follows with Dr. Chauhan, CKD possible sec to DM, baseline could be ~2.1, poor PO for the past week. Jose possible prerenal now improving. pending work up to r/o other etiology  check urine cr, na, eos, ua, renal US  D/C IVF in view of LE edema  check PTH

## 2017-06-27 NOTE — H&P ADULT - PROBLEM SELECTOR PLAN 5
DVT ppx: heparin SQ  Diet: CC Diet   Dispo: when medically improved - Will hold home Januvia 100  - Will continue conservative Lantus 12 BID instead of home dose of 20 BID, will titrate as needed  - ISS, and FS  - Hemoglobin A1c pending

## 2017-06-27 NOTE — H&P ADULT - ASSESSMENT
56F pmhx HTN, HLD, DMT2, asthma, CKD presenting with three day history of SOB most likely 2/2 asthma exacerbation 56F pmhx HTN, HLD, DMT2, asthma, CKD presenting with three day history of SOB most likely 2/2 asthma exacerbation, found to have progression of renal disease complicated by hyperkalemia and metabolic acidosis 57yo Female, ambulatory without assistive devices, hx of HTN, HLD, DM Type2, asthma, CKD presenting with three day history of SOB 2/2 asthma exacerbation, found to have progression of renal disease complicated by hyperkalemia and metabolic acidosis. Also CP at rest, concerned for cardiac etiology given risk factors;

## 2017-06-27 NOTE — H&P ADULT - LYMPHATIC
posterior cervical L/anterior cervical R/supraclavicular R/supraclavicular L/posterior cervical R/anterior cervical L

## 2017-06-27 NOTE — H&P ADULT - NSHPSOCIALHISTORY_GEN_ALL_CORE
No alcohol, drug, alcohol use  Lives with , son, and daughter-in- law No alcohol, drug, alcohol use  Lives with , son, and daughter-in- law  Homemaker   Never smoked

## 2017-06-27 NOTE — H&P ADULT - HISTORY OF PRESENT ILLNESS
56F pmhx HTN, HLD, DMT2, asthma, CKD presenting with three day history of SOB. Patient reports increased wheezing that started Saturday. Associated symptoms include chills, cough, and yellow sputum production. Denies fevers, nausea, vomiting, palpitations rhinorrhea, sore throat. Reports three loose bowel movements today. No abdominal pain, constipation, melena or hematochezia. Lives at home wit , son, and daughter-in law. No sick contacts or recent travel. Patient reports she had some chest pain on initial admission, but resolved with nitroglycerin. Patient reports she has had asthma exacerbations in the past that did not require intubation. She has not had an exacerbation in several years and is not currently on any medications for it. Patient denies headaches or blurry vision.     In the ED, T 98.6 -236/64-96 HR 73 RR 24 SpO2 98. Patient was given prednisone 40. Hydralazine 25, hydralazine 10, enalapril 20. Nitroglycerin x2. Duonebs x2. Insulin 10 and dextrose for hyperkalemia on admission. 55yo Female hx of HTN, HLD, DM Type2, asthma, CKD presenting with three day history of SOB. Patient reports increased wheezing that started Saturday. Associated symptoms include chills, cough, and yellow sputum production. Denies fevers, nausea, vomiting, palpitations rhinorrhea, sore throat. Reports three loose bowel movements today. No abdominal pain, constipation, melena or hematochezia. Lives at home wit , son, and daughter-in law. No sick contacts or recent travel. Patient reports she had some chest pain on initial admission, but resolved with nitroglycerin. Patient reports she has had asthma exacerbations in the past that did not require intubation. She has not had an exacerbation in several years and is not currently on any medications for it. Patient denies headaches or blurry vision.     In the ED, T 98.6 -236/64-96 HR 73 RR 24 SpO2 98. Patient was given prednisone 40. Hydralazine 25, hydralazine 10, enalapril 20. Nitroglycerin x2. Duonebs x2. Insulin 10 and dextrose for hyperkalemia on admission. 55yo Female hx of HTN, HLD, DM Type2, asthma, CKD presenting with three day history of SOB. Patient reports increased wheezing that started Saturday. Associated symptoms include chills, cough, and yellow sputum production. Denies fevers, nausea, vomiting, palpitations rhinorrhea, sore throat. Reports three loose bowel movements today. No abdominal pain, constipation, melena or hematochezia. Lives at home wit , son, and daughter-in law. No sick contacts or recent travel. Patient reports she had "some" midsternal chest pain, 5/10, which started at rest and resolved after nitroglycerin. Patient reports she has had asthma exacerbations in the past that did not require intubation. She has not had an exacerbation in several years and is not currently on any medications for it. Patient denies headaches or blurry vision.   Of note, the pt says had stress test 2 years ago, which was "normal".    In the ED, T 98.6 -236/64-96 HR 73 RR 24 SpO2 98. Patient was given prednisone 40. Hydralazine 25, hydralazine 10, enalapril 20. Nitroglycerin x3 doses. Duonebs x2. Insulin 10 and dextrose for hyperkalemia on admission. 55yo Female, ambulatory without assistive devices, hx of HTN, HLD, DM Type2, asthma, CKD presenting with three day history of SOB. Patient reports increased wheezing that started Saturday. Associated symptoms include chills, cough, and yellow sputum production. Denies fevers, nausea, vomiting, palpitations rhinorrhea, sore throat. Reports three loose bowel movements today. No abdominal pain, constipation, melena or hematochezia. Lives at home wit , son, and daughter-in law. No sick contacts or recent travel. Patient reports she had "some" midsternal chest pain, 5/10, which started at rest and resolved after nitroglycerin. Patient reports she has had asthma exacerbations in the past that did not require intubation. She has not had an exacerbation in several years and is not currently on any medications for it. Patient denies headaches or blurry vision.   Of note, the pt says had stress test 2 years ago, which was "normal".    In the ED, T 98.6 -236/64-96 HR 73 RR 24 SpO2 98. Patient was given prednisone 40. Hydralazine 25, hydralazine 10, enalapril 20. Nitroglycerin x3 doses. Duonebs x2. Insulin 10 and dextrose for hyperkalemia on admission.

## 2017-06-27 NOTE — H&P ADULT - NSHPLABSRESULTS_GEN_ALL_CORE
140  |  108<H>  |  39<H>  ----------------------------<  153<H>  5.5<H>   |  16<L>  |  2.56<H>      140  |  110<H>  |  40<H>  ----------------------------<  83  5.9<H>   |  16<L>  |  2.67<H>    Ca    8.1<L>      2017 19:46  Ca    8.1<L>      2017 15:10    TPro  5.9<L>  /  Alb  3.1<L>  /  TBili  0.3  /  DBili  x   /  AST  17  /  ALT  37<H>  /  AlkPhos  69                      Urinalysis Basic - ( 2017 22:40 )    Color: PLYEL / Appearance: CLEAR / S.013 / pH: 6.0  Gluc: NEGATIVE / Ketone: NEGATIVE  / Bili: NEGATIVE / Urobili: NORMAL E.U.   Blood: NEGATIVE / Protein: 150 / Nitrite: NEGATIVE   Leuk Esterase: NEGATIVE / RBC: 0-2 / WBC 2-5   Sq Epi: OCC / Non Sq Epi: x / Bacteria: x                              9.3    9.91  )-----------( 178      ( 2017 15:10 )             30.5     CAPILLARY BLOOD GLUCOSE      CXR  Clear lungs. No pleural effusions or pneumothorax.    Stable prominent appearing cardiac and mediastinal silhouettes. Prominent   epicardial fat obscures medial right hemidiaphragm.    Trachea projects to right of midline but proportionate to degree of   patient rotation.    Unremarkable osseous structures. EKG, nsr, 82bpm, qtc 436, no acute Tw or ST changes - my reading       140  |  108<H>  |  39<H>  ----------------------------<  153<H>  5.5<H>   |  16<L>  |  2.56<H>      140  |  110<H>  |  40<H>  ----------------------------<  83  5.9<H>   |  16<L>  |  2.67<H>    Ca    8.1<L>      2017 19:46  Ca    8.1<L>      2017 15:10    TPro  5.9<L>  /  Alb  3.1<L>  /  TBili  0.3  /  DBili  x   /  AST  17  /  ALT  37<H>  /  AlkPhos  69                      Urinalysis Basic - ( 2017 22:40 )    Color: PLYEL / Appearance: CLEAR / S.013 / pH: 6.0  Gluc: NEGATIVE / Ketone: NEGATIVE  / Bili: NEGATIVE / Urobili: NORMAL E.U.   Blood: NEGATIVE / Protein: 150 / Nitrite: NEGATIVE   Leuk Esterase: NEGATIVE / RBC: 0-2 / WBC 2-5   Sq Epi: OCC / Non Sq Epi: x / Bacteria: x                              9.3    9.91  )-----------( 178      ( 2017 15:10 )             30.5     CAPILLARY BLOOD GLUCOSE      CXR  Clear lungs. No pleural effusions or pneumothorax.    Stable prominent appearing cardiac and mediastinal silhouettes. Prominent   epicardial fat obscures medial right hemidiaphragm.    Trachea projects to right of midline but proportionate to degree of   patient rotation.    Unremarkable osseous structures.

## 2017-06-27 NOTE — H&P ADULT - PROBLEM SELECTOR PLAN 1
RVP negative   - Will continue Duonebs q6hrs   - Prednisone 40 PO Day 2/5   - Will hold off on abx unless patient becomes febrile or concern for pneumonia  - Supplemental oxygen as needed RVP negative   - Will continue Duonebs q6hrs   - Prednisone 40 PO Day 2/5   - Will hold off on abx unless patient becomes febrile or concern for pneumonia  - Supplemental oxygen as needed  - Peak Flow meter

## 2017-06-27 NOTE — H&P ADULT - NSHPPHYSICALEXAM_GEN_ALL_CORE
PHYSICAL EXAM:  GENERAL: NAD, well-groomed, well-developed  HEAD:  Atraumatic, Normocephalic  EYES: conjunctiva and sclera clear  ENMT: No tonsillar erythema, exudates, or enlargement; Moist mucous membranes, Good dentition, No lesions  CHEST/LUNG: diffuse expiratory wheezes  HEART: Regular rate and rhythm; No murmurs, rubs, or gallops  ABDOMEN: Soft, Nontender, Nondistended; Bowel sounds present  EXTREMITIES:  2+ Peripheral Pulses, No clubbing, cyanosis, or edema  SKIN: No rashes or lesions  NERVOUS SYSTEM:  Alert & Oriented X3, Good concentratio

## 2017-06-27 NOTE — CONSULT NOTE ADULT - ASSESSMENT
EKG - NSR @ 82 bpm no STT changes  CE - negative     a/p     1) Chest pain - atypical in nature, likely secondary to uncontrolled HTN, will get 2d echo, stress test after asthma exacerbation improves    2) CKD -  stable     3) Asthma exacerbation -  continue prednisone and albuterol    4) HTN urgency - BP improving in 160 cont coreg and hydralazine

## 2017-06-27 NOTE — H&P ADULT - PROBLEM SELECTOR PLAN 2
- Continue home medications hydralazine 25 BID, carvedilol 25 BID, enalapril 20 r/o ACS; Resolved after Nitro x3 doses; No acute EKG changes;   -Cardiology c/s in AM for possible ischemic evaluation given risk factors (after acute asthma exacerbation resolves)  -c/w Asa ppx  -A1c, TSH, Lipid panel

## 2017-06-27 NOTE — H&P ADULT - NSHPREVIEWOFSYSTEMS_GEN_ALL_CORE
REVIEW OF SYSTEMS:  CONSTITUTIONAL: No fever or fatigue  EYES: No eye pain, visual disturbances, or discharge  ENMT:  No difficulty hearing or throat pain  RESPIRATORY: shortness of breath, cough, chills  CARDIOVASCULAR: No chest pain, palpitations  GASTROINTESTINAL: No abdominal or epigastric pain. No nausea, vomiting, or hematemesis; No diarrhea or constipation. No melena or hematochezia.  GENITOURINARY: No dysuria, frequency, hematuria, or incontinence  NEUROLOGICAL: No headaches  SKIN: No  rashes  HEME/LYMPH: No easy bruising, or bleeding gums REVIEW OF SYSTEMS  CONSTITUTIONAL: No fever or fatigue  EYES: No eye pain, visual disturbances, or discharge  ENMT:  No difficulty hearing or throat pain  RESPIRATORY: shortness of breath, cough, chills  CARDIOVASCULAR: No chest pain, palpitations  GASTROINTESTINAL: No abdominal or epigastric pain. No nausea, vomiting, or hematemesis; No diarrhea or constipation. No melena or hematochezia.  GENITOURINARY: No dysuria, frequency, hematuria, or incontinence  NEUROLOGICAL: No headaches  SKIN: No  rashes  HEME/LYMPH: No easy bruising, or bleeding gums

## 2017-06-27 NOTE — CONSULT NOTE ADULT - PROBLEM SELECTOR RECOMMENDATION 4
CKD on ACEI and uncontrolled sugar. D/C ACEI, r/o type IV RTA, check urine and serum osmo, urine K. Kayexalate 30g x1, D5w, insulin, calcium gluconate given. monitor BMP

## 2017-06-27 NOTE — H&P ADULT - PROBLEM SELECTOR PLAN 4
Stage IV  - Will continue to monitor   - Patient was hyperkalemic on admission, will repeat now Stage IV  - Will continue to monitor   - Patient was hyperkalemic on admission, will repeat now  - Continue calcitrol 0.25 qd Stage IV complicated by kyperkalemia and metabolic acidosis   - Will continue to monitor   - Patient was hyperkalemic on admission, will repeat now  - Continue calcitrol 0.25 qd  - Will consult Nephrology in AM (home nephrologist Flip Chauhan)  - Started on bicarb 650 BID - Continue home medications hydralazine 25 BID, carvedilol 25 BID, enalapril 20

## 2017-06-27 NOTE — CONSULT NOTE ADULT - SUBJECTIVE AND OBJECTIVE BOX
CHIEF COMPLAINT:  Pt is a 55 yo female h/o asthma, DM2 , HTN , HLD comes in with worsening SOB for the last 3 days, pt denies any chest pain on exertion at home but in ER she had an episode of chest pain, lasted for few minutes, pt denies any chest pain on exertion at home, no palpitations, does get SOB on exertion at home, she also has a history of CKD for which she is being followed by out pt nephrologist , no current chest pain or SOB     HISTORY OF PRESENT ILLNESS:    PAST MEDICAL & SURGICAL HISTORY:  Kidney disease due to severe high blood pressure, stage 1-4 or unspecified chronic kidney disease  Asthma  Hyperlipidemia  DM (diabetes mellitus)  HTN (hypertension)  Asthma  Hyperlipidemia  Hypertension  Diabetes mellitus  No significant past surgical history        MEDICATIONS:  heparin  Injectable 5000 Unit(s) SubCutaneous every 8 hours  hydrALAZINE 25 milliGRAM(s) Oral two times a day  aspirin enteric coated 81 milliGRAM(s) Oral daily  carvedilol 25 milliGRAM(s) Oral every 12 hours  ALBUTerol/ipratropium for Nebulization 3 milliLiter(s) Nebulizer every 6 hours  pantoprazole    Tablet 40 milliGRAM(s) Oral before breakfast  insulin glargine Injectable (LANTUS) 12 Unit(s) SubCutaneous two times a day  insulin lispro (HumaLOG) corrective regimen sliding scale   SubCutaneous three times a day before meals  insulin lispro (HumaLOG) corrective regimen sliding scale   SubCutaneous at bedtime  dextrose Gel 1 Dose(s) Oral once PRN  dextrose 50% Injectable 12.5 Gram(s) IV Push once  dextrose 50% Injectable 25 Gram(s) IV Push once  dextrose 50% Injectable 25 Gram(s) IV Push once  glucagon  Injectable 1 milliGRAM(s) IntraMuscular once PRN  predniSONE   Tablet 40 milliGRAM(s) Oral daily    dextrose 5%. 1000 milliLiter(s) IV Continuous <Continuous>  calcitriol   Capsule 0.25 MICROGram(s) Oral daily  sodium bicarbonate 650 milliGRAM(s) Oral every 12 hours  sodium chloride 0.9%. 1000 milliLiter(s) IV Continuous <Continuous>      FAMILY HISTORY:  No pertinent family history in first degree relatives      Allergies    No Known Allergies    Intolerances    codeine (Other)  	  PHYSICAL EXAM:  T(C): 36.9 (06-27-17 @ 14:30), Max: 37.1 (06-26-17 @ 15:58)  HR: 87 (06-27-17 @ 14:30) (68 - 87)  BP: 167/81 (06-27-17 @ 14:30) (154/80 - 236/83)  RR: 18 (06-27-17 @ 14:30) (15 - 24)  SpO2: 98% (06-27-17 @ 14:30) (97% - 100%)  Wt(kg): --  I&O's Summary      Appearance: Normal	  HEENT:   + jvd	  Cardiovascular: Normal S1 S2, No JVD, No murmurs, No edema  Respiratory: B/L RHONCHI  Gastrointestinal:  Soft, Non-tender, + BS	  Extremities: 2+ EDEMA    TELEMETRY: 	    ECG:  	  RADIOLOGY:  OTHER: 	  	  LABS:	 	    CARDIAC MARKERS:      CKMB: 2.44 ng/mL (06-26 @ 22:10)  CKMB: 2.20 ng/mL (06-26 @ 15:10)                              10.6   7.40  )-----------( 194      ( 27 Jun 2017 12:05 )             33.6     06-27    137  |  104  |  36<H>  ----------------------------<  237<H>  5.7<H>   |  18<L>  |  2.34<H>    Ca    8.8      27 Jun 2017 12:05  Phos  3.8     06-27  Mg     1.1     06-27    TPro  5.9<L>  /  Alb  3.1<L>  /  TBili  0.3  /  DBili  x   /  AST  17  /  ALT  37<H>  /  AlkPhos  69  06-26    proBNP: Serum Pro-Brain Natriuretic Peptide: 1732 pg/mL (06-26 @ 15:10)    Lipid Profile:   HgA1c: Hemoglobin A1C, Whole Blood: 7.1 % (06-27 @ 03:20)    TSH: Thyroid Stimulating Hormone, Serum: 0.89 uIU/mL (06-27 @ 12:05)      ASSESSMENT/PLAN:

## 2017-06-28 DIAGNOSIS — N25.81 SECONDARY HYPERPARATHYROIDISM OF RENAL ORIGIN: ICD-10-CM

## 2017-06-28 DIAGNOSIS — R69 ILLNESS, UNSPECIFIED: ICD-10-CM

## 2017-06-28 LAB
APPEARANCE UR: CLEAR — SIGNIFICANT CHANGE UP
BACTERIA # UR AUTO: SIGNIFICANT CHANGE UP
BILIRUB UR-MCNC: NEGATIVE — SIGNIFICANT CHANGE UP
BLOOD UR QL VISUAL: NEGATIVE — SIGNIFICANT CHANGE UP
BUN SERPL-MCNC: 39 MG/DL — HIGH (ref 7–23)
CALCIUM SERPL-MCNC: 8.4 MG/DL — SIGNIFICANT CHANGE UP (ref 8.4–10.5)
CHLORIDE SERPL-SCNC: 104 MMOL/L — SIGNIFICANT CHANGE UP (ref 98–107)
CO2 SERPL-SCNC: 19 MMOL/L — LOW (ref 22–31)
COLOR SPEC: SIGNIFICANT CHANGE UP
CREAT ?TM UR-MCNC: 46.68 MG/DL — SIGNIFICANT CHANGE UP
CREAT SERPL-MCNC: 2.42 MG/DL — HIGH (ref 0.5–1.3)
EOSINOPHIL NFR URNS MANUAL: SIGNIFICANT CHANGE UP (ref 0–0)
GLUCOSE SERPL-MCNC: 133 MG/DL — HIGH (ref 70–99)
GLUCOSE UR-MCNC: 500 — SIGNIFICANT CHANGE UP
HCT VFR BLD CALC: 33.8 % — LOW (ref 34.5–45)
HGB BLD-MCNC: 10.5 G/DL — LOW (ref 11.5–15.5)
KETONES UR-MCNC: NEGATIVE — SIGNIFICANT CHANGE UP
LEUKOCYTE ESTERASE UR-ACNC: NEGATIVE — SIGNIFICANT CHANGE UP
MAGNESIUM SERPL-MCNC: 1.4 MG/DL — LOW (ref 1.6–2.6)
MCHC RBC-ENTMCNC: 27.6 PG — SIGNIFICANT CHANGE UP (ref 27–34)
MCHC RBC-ENTMCNC: 31.1 % — LOW (ref 32–36)
MCV RBC AUTO: 88.7 FL — SIGNIFICANT CHANGE UP (ref 80–100)
MUCOUS THREADS # UR AUTO: SIGNIFICANT CHANGE UP
NITRITE UR-MCNC: NEGATIVE — SIGNIFICANT CHANGE UP
OSMOLALITY SERPL: 298 MOSMO/KG — HIGH (ref 275–295)
OSMOLALITY UR: 417 MOSMO/KG — SIGNIFICANT CHANGE UP (ref 50–1200)
PH UR: 6 — SIGNIFICANT CHANGE UP (ref 4.6–8)
PLATELET # BLD AUTO: 195 K/UL — SIGNIFICANT CHANGE UP (ref 150–400)
PMV BLD: 12.4 FL — SIGNIFICANT CHANGE UP (ref 7–13)
POTASSIUM SERPL-MCNC: 4.5 MMOL/L — SIGNIFICANT CHANGE UP (ref 3.5–5.3)
POTASSIUM SERPL-SCNC: 4.5 MMOL/L — SIGNIFICANT CHANGE UP (ref 3.5–5.3)
POTASSIUM UR-SCNC: 15.3 MEQ/L — SIGNIFICANT CHANGE UP
PROT UR-MCNC: 139.5 MG/DL — SIGNIFICANT CHANGE UP
PROT UR-MCNC: 150 — HIGH
PTH-INTACT SERPL-MCNC: 169.2 PG/ML — HIGH (ref 15–65)
PTH-INTACT SERPL-MCNC: 169.2 PG/ML — HIGH (ref 15–65)
RBC # BLD: 3.81 M/UL — SIGNIFICANT CHANGE UP (ref 3.8–5.2)
RBC # FLD: 14.5 % — SIGNIFICANT CHANGE UP (ref 10.3–14.5)
RBC CASTS # UR COMP ASSIST: SIGNIFICANT CHANGE UP (ref 0–?)
SODIUM SERPL-SCNC: 138 MMOL/L — SIGNIFICANT CHANGE UP (ref 135–145)
SODIUM UR-SCNC: 96 MEQ/L — SIGNIFICANT CHANGE UP
SP GR SPEC: 1.01 — SIGNIFICANT CHANGE UP (ref 1–1.03)
SQUAMOUS # UR AUTO: SIGNIFICANT CHANGE UP
UROBILINOGEN FLD QL: NORMAL E.U. — SIGNIFICANT CHANGE UP (ref 0.1–0.2)
WBC # BLD: 11.23 K/UL — HIGH (ref 3.8–10.5)
WBC # FLD AUTO: 11.23 K/UL — HIGH (ref 3.8–10.5)
WBC UR QL: SIGNIFICANT CHANGE UP (ref 0–?)

## 2017-06-28 PROCEDURE — 76775 US EXAM ABDO BACK WALL LIM: CPT | Mod: 26

## 2017-06-28 PROCEDURE — 93306 TTE W/DOPPLER COMPLETE: CPT | Mod: 26

## 2017-06-28 PROCEDURE — 99233 SBSQ HOSP IP/OBS HIGH 50: CPT

## 2017-06-28 RX ORDER — HYDRALAZINE HCL 50 MG
50 TABLET ORAL EVERY 8 HOURS
Qty: 0 | Refills: 0 | Status: DISCONTINUED | OUTPATIENT
Start: 2017-06-28 | End: 2017-06-29

## 2017-06-28 RX ORDER — INSULIN GLARGINE 100 [IU]/ML
16 INJECTION, SOLUTION SUBCUTANEOUS
Qty: 0 | Refills: 0 | Status: DISCONTINUED | OUTPATIENT
Start: 2017-06-28 | End: 2017-06-29

## 2017-06-28 RX ADMIN — CARVEDILOL PHOSPHATE 25 MILLIGRAM(S): 80 CAPSULE, EXTENDED RELEASE ORAL at 18:25

## 2017-06-28 RX ADMIN — Medication 50 MILLIGRAM(S): at 13:44

## 2017-06-28 RX ADMIN — Medication 3 MILLILITER(S): at 21:03

## 2017-06-28 RX ADMIN — Medication 650 MILLIGRAM(S): at 18:25

## 2017-06-28 RX ADMIN — Medication 3 MILLILITER(S): at 15:28

## 2017-06-28 RX ADMIN — CALCITRIOL 0.25 MICROGRAM(S): 0.5 CAPSULE ORAL at 12:36

## 2017-06-28 RX ADMIN — Medication 50 MILLIGRAM(S): at 21:35

## 2017-06-28 RX ADMIN — CARVEDILOL PHOSPHATE 25 MILLIGRAM(S): 80 CAPSULE, EXTENDED RELEASE ORAL at 06:29

## 2017-06-28 RX ADMIN — Medication: at 12:00

## 2017-06-28 RX ADMIN — Medication 3 MILLILITER(S): at 04:10

## 2017-06-28 RX ADMIN — Medication: at 18:16

## 2017-06-28 RX ADMIN — Medication 650 MILLIGRAM(S): at 06:29

## 2017-06-28 RX ADMIN — HEPARIN SODIUM 5000 UNIT(S): 5000 INJECTION INTRAVENOUS; SUBCUTANEOUS at 21:34

## 2017-06-28 RX ADMIN — PANTOPRAZOLE SODIUM 40 MILLIGRAM(S): 20 TABLET, DELAYED RELEASE ORAL at 06:29

## 2017-06-28 RX ADMIN — HEPARIN SODIUM 5000 UNIT(S): 5000 INJECTION INTRAVENOUS; SUBCUTANEOUS at 06:29

## 2017-06-28 RX ADMIN — Medication 3 MILLILITER(S): at 09:39

## 2017-06-28 RX ADMIN — Medication 81 MILLIGRAM(S): at 11:51

## 2017-06-28 RX ADMIN — INSULIN GLARGINE 12 UNIT(S): 100 INJECTION, SOLUTION SUBCUTANEOUS at 09:34

## 2017-06-28 RX ADMIN — Medication 40 MILLIGRAM(S): at 06:29

## 2017-06-28 RX ADMIN — INSULIN GLARGINE 16 UNIT(S): 100 INJECTION, SOLUTION SUBCUTANEOUS at 23:06

## 2017-06-28 RX ADMIN — HEPARIN SODIUM 5000 UNIT(S): 5000 INJECTION INTRAVENOUS; SUBCUTANEOUS at 13:44

## 2017-06-28 RX ADMIN — Medication 25 MILLIGRAM(S): at 06:29

## 2017-06-28 NOTE — PROGRESS NOTE ADULT - SUBJECTIVE AND OBJECTIVE BOX
Patient is a 56y old  Female who presents with a chief complaint of fever and chills x 3days (2017 18:22)      SUBJECTIVE / OVERNIGHT EVENTS:    MEDICATIONS  (STANDING):  heparin  Injectable 5000 Unit(s) SubCutaneous every 8 hours  insulin glargine Injectable (LANTUS) 12 Unit(s) SubCutaneous two times a day  insulin lispro (HumaLOG) corrective regimen sliding scale   SubCutaneous three times a day before meals  insulin lispro (HumaLOG) corrective regimen sliding scale   SubCutaneous at bedtime  dextrose 5%. 1000 milliLiter(s) (50 mL/Hr) IV Continuous <Continuous>  dextrose 50% Injectable 12.5 Gram(s) IV Push once  dextrose 50% Injectable 25 Gram(s) IV Push once  dextrose 50% Injectable 25 Gram(s) IV Push once  ALBUTerol/ipratropium for Nebulization 3 milliLiter(s) Nebulizer every 6 hours  pantoprazole    Tablet 40 milliGRAM(s) Oral before breakfast  hydrALAZINE 25 milliGRAM(s) Oral two times a day  calcitriol   Capsule 0.25 MICROGram(s) Oral daily  predniSONE   Tablet 40 milliGRAM(s) Oral daily  sodium bicarbonate 650 milliGRAM(s) Oral every 12 hours  aspirin enteric coated 81 milliGRAM(s) Oral daily  carvedilol 25 milliGRAM(s) Oral every 12 hours    MEDICATIONS  (PRN):  dextrose Gel 1 Dose(s) Oral once PRN Blood Glucose LESS THAN 70 milliGRAM(s)/deciliter  glucagon  Injectable 1 milliGRAM(s) IntraMuscular once PRN Glucose LESS THAN 70 milligrams/deciliter      Vital Signs Last 24 Hrs  T(C): 36.6 (17 @ 06:26), Max: 37 (17 @ 10:45)  HR: 72 (17 @ 06:26) (70 - 91)  BP: 150/82 (17 @ 06:26) (143/68 - 167/81)  RR: 18 (17 @ 06:26) (18 - 19)  SpO2: 100% (17 @ 06:26) (97% - 100%)  CAPILLARY BLOOD GLUCOSE  120 (2017 08:28)  258 (2017 23:27)  220 (2017 18:38)  290 (2017 17:36)  213 (2017 12:24)  155 (2017 09:20)        I&O's Summary    2017 07:01  -  2017 07:00  --------------------------------------------------------  IN: 220 mL / OUT: 0 mL / NET: 220 mL        PHYSICAL EXAM:  GENERAL: NAD, well-developed  HEAD:  Atraumatic, Normocephalic  EYES: EOMI, PERRLA, conjunctiva and sclera clear  NECK: Supple, No JVD  CHEST/LUNG: Clear to auscultation bilaterally; No wheeze  HEART: Regular rate and rhythm; No murmurs, rubs, or gallops  ABDOMEN: Soft, Nontender, Nondistended; Bowel sounds present  EXTREMITIES:  2+ Peripheral Pulses, No clubbing, cyanosis, or edema  PSYCH: AAOx3  NEUROLOGY: non-focal  SKIN: No rashes or lesions    LABS:                        10.5   11.23 )-----------( 195      ( 2017 05:30 )             33.8     06-28    138  |  104  |  39<H>  ----------------------------<  133<H>  4.5   |  19<L>  |  2.42<H>    Ca    8.4      2017 05:30  Phos  3.8     06-27  Mg     1.4     -28    TPro  5.9<L>  /  Alb  3.1<L>  /  TBili  0.3  /  DBili  x   /  AST  17  /  ALT  37<H>  /  AlkPhos  69  06-26      CARDIAC MARKERS ( 2017 22:10 )  x     / < 0.06 ng/mL / 72 u/L / 2.44 ng/mL / x      CARDIAC MARKERS ( 2017 15:10 )  x     / < 0.06 ng/mL / 78 u/L / 2.20 ng/mL / x          Urinalysis Basic - ( 2017 02:25 )    Color: PLYEL / Appearance: CLEAR / S.011 / pH: 6.0  Gluc: 500 / Ketone: NEGATIVE  / Bili: NEGATIVE / Urobili: NORMAL E.U.   Blood: NEGATIVE / Protein: 150 / Nitrite: NEGATIVE   Leuk Esterase: NEGATIVE / RBC: 0-2 / WBC 0-2   Sq Epi: OCC / Non Sq Epi: x / Bacteria: FEW        RADIOLOGY & ADDITIONAL TESTS:    Imaging Personally Reviewed:    Consultant(s) Notes Reviewed:      Care Discussed with Consultants/Other Providers: Patient is a 56y old  Female who presents with a chief complaint of fever and chills x 3days (2017 18:22)      SUBJECTIVE / OVERNIGHT EVENTS: Pt in NAD no SOB/fever/CP all other review of systems neg    MEDICATIONS  (STANDING):  heparin  Injectable 5000 Unit(s) SubCutaneous every 8 hours  insulin glargine Injectable (LANTUS) 12 Unit(s) SubCutaneous two times a day  insulin lispro (HumaLOG) corrective regimen sliding scale   SubCutaneous three times a day before meals  insulin lispro (HumaLOG) corrective regimen sliding scale   SubCutaneous at bedtime  dextrose 5%. 1000 milliLiter(s) (50 mL/Hr) IV Continuous <Continuous>  dextrose 50% Injectable 12.5 Gram(s) IV Push once  dextrose 50% Injectable 25 Gram(s) IV Push once  dextrose 50% Injectable 25 Gram(s) IV Push once  ALBUTerol/ipratropium for Nebulization 3 milliLiter(s) Nebulizer every 6 hours  pantoprazole    Tablet 40 milliGRAM(s) Oral before breakfast  hydrALAZINE 25 milliGRAM(s) Oral two times a day  calcitriol   Capsule 0.25 MICROGram(s) Oral daily  predniSONE   Tablet 40 milliGRAM(s) Oral daily  sodium bicarbonate 650 milliGRAM(s) Oral every 12 hours  aspirin enteric coated 81 milliGRAM(s) Oral daily  carvedilol 25 milliGRAM(s) Oral every 12 hours    MEDICATIONS  (PRN):  dextrose Gel 1 Dose(s) Oral once PRN Blood Glucose LESS THAN 70 milliGRAM(s)/deciliter  glucagon  Injectable 1 milliGRAM(s) IntraMuscular once PRN Glucose LESS THAN 70 milligrams/deciliter      Vital Signs Last 24 Hrs  T(C): 36.6 (17 @ 06:26), Max: 37 (17 @ 10:45)  HR: 72 (17 @ 06:26) (70 - 91)  BP: 150/82 (17 @ 06:26) (143/68 - 167/81)  RR: 18 (17 @ 06:26) (18 - 19)  SpO2: 100% (17 @ 06:26) (97% - 100%)  CAPILLARY BLOOD GLUCOSE  120 (2017 08:28)  258 (2017 23:27)  220 (2017 18:38)  290 (2017 17:36)  213 (2017 12:24)  155 (2017 09:20)        I&O's Summary    2017 07:01  -  2017 07:00  --------------------------------------------------------  IN: 220 mL / OUT: 0 mL / NET: 220 mL        PHYSICAL EXAM:  GENERAL: NAD, well-developed  HEAD:  Atraumatic, Normocephalic  EYES: EOMI, PERRLA, conjunctiva and sclera clear  NECK: Supple, No JVD  CHEST/LUNG: Clear to auscultation bilaterally; No wheeze  HEART: Regular rate and rhythm; No murmurs, rubs, or gallops  ABDOMEN: Soft, Nontender, Nondistended; Bowel sounds present  EXTREMITIES:  2+ Peripheral Pulses  PSYCH: AAOx3  NEUROLOGY: non-focal  SKIN: No rashes or lesions    LABS:                        10.5   11.23 )-----------( 195      ( 2017 05:30 )             33.8     06-28    138  |  104  |  39<H>  ----------------------------<  133<H>  4.5   |  19<L>  |  2.42<H>    Ca    8.4      2017 05:30  Phos  3.8     06-27  Mg     1.4     06-28    TPro  5.9<L>  /  Alb  3.1<L>  /  TBili  0.3  /  DBili  x   /  AST  17  /  ALT  37<H>  /  AlkPhos  69  06-26      CARDIAC MARKERS ( 2017 22:10 )  x     / < 0.06 ng/mL / 72 u/L / 2.44 ng/mL / x      CARDIAC MARKERS ( 2017 15:10 )  x     / < 0.06 ng/mL / 78 u/L / 2.20 ng/mL / x          Urinalysis Basic - ( 2017 02:25 )    Color: PLYEL / Appearance: CLEAR / S.011 / pH: 6.0  Gluc: 500 / Ketone: NEGATIVE  / Bili: NEGATIVE / Urobili: NORMAL E.U.   Blood: NEGATIVE / Protein: 150 / Nitrite: NEGATIVE   Leuk Esterase: NEGATIVE / RBC: 0-2 / WBC 0-2   Sq Epi: OCC / Non Sq Epi: x / Bacteria: FEW        RADIOLOGY & ADDITIONAL TESTS:    Imaging Personally Reviewed:  Renal parenchymal disease without hydronephrosis bilaterally.        Consultant(s) Notes Reviewed:      Care Discussed with Consultants/Other Providers:

## 2017-06-28 NOTE — PROGRESS NOTE ADULT - SUBJECTIVE AND OBJECTIVE BOX
Patient is a 56y old  Female who presents with a chief complaint of fever and chills x 3days (27 Jun 2017 18:22)      Patient seen and examined at bedside. No chest pain/sob    VITALS:  T(F): 97.8 (06-28-17 @ 06:26), Max: 98.5 (06-27-17 @ 14:30)  HR: 77 (06-28-17 @ 09:40)  BP: 150/82 (06-28-17 @ 06:26)  RR: 18 (06-28-17 @ 06:26)  SpO2: 100% (06-28-17 @ 06:26)  Wt(kg): --    06-27 @ 07:01  -  06-28 @ 07:00  --------------------------------------------------------  IN: 220 mL / OUT: 0 mL / NET: 220 mL    06-28 @ 07:01  -  06-28 @ 11:31  --------------------------------------------------------  IN: 240 mL / OUT: 0 mL / NET: 240 mL      Height (cm): 142.24 (06-27 @ 18:38)  Weight (kg): 113.4 (06-27 @ 18:38)  BMI (kg/m2): 56 (06-27 @ 18:38)  BSA (m2): 1.95 (06-27 @ 18:38)    PHYSICAL EXAM:  Constitutional: NAD  Neck: No JVD  Respiratory: right side rhonchi  Cardiovascular: S1, S2, RRR  Gastrointestinal: BS+, soft, NT/ND  Extremities: 2+ peripheral edema right >L    Hospital Medications:   MEDICATIONS  (STANDING):  heparin  Injectable 5000 Unit(s) SubCutaneous every 8 hours  insulin glargine Injectable (LANTUS) 12 Unit(s) SubCutaneous two times a day  insulin lispro (HumaLOG) corrective regimen sliding scale   SubCutaneous three times a day before meals  insulin lispro (HumaLOG) corrective regimen sliding scale   SubCutaneous at bedtime  dextrose 5%. 1000 milliLiter(s) (50 mL/Hr) IV Continuous <Continuous>  dextrose 50% Injectable 12.5 Gram(s) IV Push once  dextrose 50% Injectable 25 Gram(s) IV Push once  dextrose 50% Injectable 25 Gram(s) IV Push once  ALBUTerol/ipratropium for Nebulization 3 milliLiter(s) Nebulizer every 6 hours  pantoprazole    Tablet 40 milliGRAM(s) Oral before breakfast  hydrALAZINE 25 milliGRAM(s) Oral two times a day  calcitriol   Capsule 0.25 MICROGram(s) Oral daily  predniSONE   Tablet 40 milliGRAM(s) Oral daily  sodium bicarbonate 650 milliGRAM(s) Oral every 12 hours  aspirin enteric coated 81 milliGRAM(s) Oral daily  carvedilol 25 milliGRAM(s) Oral every 12 hours      LABS:  06-28    138  |  104  |  39<H>  ----------------------------<  133<H>  4.5   |  19<L>  |  2.42<H>    Ca    8.4      28 Jun 2017 05:30  Phos  3.8     06-27  Mg     1.4     06-28    TPro  5.9<L>  /  Alb  3.1<L>  /  TBili  0.3  /  DBili      /  AST  17  /  ALT  37<H>  /  AlkPhos  69  06-26    Creatinine Trend: 2.42 <--, 2.34 <--, 2.39 <--, 2.56 <--, 2.67 <--    Phosphorus Level, Serum: 3.8 mg/dL (06-27 @ 12:05)    calcium--  intact pth--  parathyroid hormone intact, [id number]                            10.5   11.23 )-----------( 195      ( 28 Jun 2017 05:30 )             33.8     Urine Studies:  Urinalysis - [06-28-17 @ 02:25]      Color PLYEL / Appearance CLEAR / SG 1.011 / pH 6.0      Gluc 500 / Ketone NEGATIVE  / Bili NEGATIVE / Urobili NORMAL       Blood NEGATIVE / Protein 150 / Leuk Est NEGATIVE / Nitrite NEGATIVE      RBC 0-2 / WBC 0-2 / Hyaline  / Gran  / Sq Epi OCC / Non Sq Epi  / Bacteria FEW    Urine Creatinine 46.68      [06-28-17 @ 02:25]  Urine Protein 139.5      [06-28-17 @ 02:25]  Urine Sodium 96      [06-28-17 @ 02:25]  Urine Potassium 15.3      [06-28-17 @ 02:25]  Urine Osmolality 417      [06-28-17 @ 02:25]    .2 (Ca --)      [06-28-17 @ 05:30]   --  HbA1c 7.1      [06-27-17 @ 03:20]  TSH 0.89      [06-27-17 @ 12:05]  Lipid: chol 161, TG 88, HDL 51, LDL 98      [06-27-17 @ 12:05]        RADIOLOGY & ADDITIONAL STUDIES:

## 2017-06-28 NOTE — PROGRESS NOTE ADULT - PROBLEM SELECTOR PLAN 1
RVP negative   - Will continue Duonebs q6hrs   - Prednisone 40 PO Day 2/5   - Will hold off on abx unless patient becomes febrile or concern for pneumonia  - Supplemental oxygen as needed  - Peak Flow meter RVP negative   - Will continue Duonebs q6hrs   - Prednisone 40 PO Day 3/5   - Supplemental oxygen as needed

## 2017-06-28 NOTE — PROGRESS NOTE ADULT - SUBJECTIVE AND OBJECTIVE BOX
INTERVAL HISTORY: Pt is lying in bed comfortable, SOB improving, no chest pains overnight  	  MEDICATIONS:  heparin  Injectable 5000 Unit(s) SubCutaneous every 8 hours  hydrALAZINE 25 milliGRAM(s) Oral two times a day  aspirin enteric coated 81 milliGRAM(s) Oral daily  carvedilol 25 milliGRAM(s) Oral every 12 hours  ALBUTerol/ipratropium for Nebulization 3 milliLiter(s) Nebulizer every 6 hours  pantoprazole    Tablet 40 milliGRAM(s) Oral before breakfast  insulin glargine Injectable (LANTUS) 12 Unit(s) SubCutaneous two times a day  insulin lispro (HumaLOG) corrective regimen sliding scale   SubCutaneous three times a day before meals  insulin lispro (HumaLOG) corrective regimen sliding scale   SubCutaneous at bedtime  dextrose Gel 1 Dose(s) Oral once PRN  dextrose 50% Injectable 12.5 Gram(s) IV Push once  dextrose 50% Injectable 25 Gram(s) IV Push once  dextrose 50% Injectable 25 Gram(s) IV Push once  glucagon  Injectable 1 milliGRAM(s) IntraMuscular once PRN  predniSONE   Tablet 40 milliGRAM(s) Oral daily  dextrose 5%. 1000 milliLiter(s) IV Continuous <Continuous>  calcitriol   Capsule 0.25 MICROGram(s) Oral daily  sodium bicarbonate 650 milliGRAM(s) Oral every 12 hours      PHYSICAL EXAM:  T(C): 36.6 (06-28-17 @ 06:26), Max: 36.9 (06-27-17 @ 14:30)  HR: 77 (06-28-17 @ 09:40) (70 - 91)  BP: 150/82 (06-28-17 @ 06:26) (143/68 - 167/81)  RR: 18 (06-28-17 @ 06:26) (18 - 18)  SpO2: 100% (06-28-17 @ 06:26) (97% - 100%)  Wt(kg): --  I&O's Summary    27 Jun 2017 07:01  -  28 Jun 2017 07:00  --------------------------------------------------------  IN: 220 mL / OUT: 0 mL / NET: 220 mL    28 Jun 2017 07:01  -  28 Jun 2017 13:26  --------------------------------------------------------  IN: 240 mL / OUT: 0 mL / NET: 240 mL      Height (cm): 142.24 (06-27 @ 18:38)  Weight (kg): 113.4 (06-27 @ 18:38)  BMI (kg/m2): 56 (06-27 @ 18:38)  BSA (m2): 1.95 (06-27 @ 18:38)    Appearance: Normal	  HEENT:   Normal oral mucosa, PERRL, EOMI	  Cardiovascular: Normal S1 S2, No JVD, No murmurs, No edema  Respiratory: b/l rhonchi  Gastrointestinal:  Soft, Non-tender, + BS	  Extremities: Normal range of motion, No clubbing, cyanosis or edema                                    10.5   11.23 )-----------( 195      ( 28 Jun 2017 05:30 )             33.8     06-28    138  |  104  |  39<H>  ----------------------------<  133<H>  4.5   |  19<L>  |  2.42<H>    Ca    8.4      28 Jun 2017 05:30  Phos  3.8     06-27  Mg     1.4     06-28    TPro  5.9<L>  /  Alb  3.1<L>  /  TBili  0.3  /  DBili  x   /  AST  17  /  ALT  37<H>  /  AlkPhos  69  06-26    proBNP:   Lipid Profile:   HgA1c:   TSH:

## 2017-06-28 NOTE — PROGRESS NOTE ADULT - PROBLEM SELECTOR PLAN 3
Stage IV complicated by hyperkalemia and metabolic acidosis   - Will continue to monitor   - Patient was hyperkalemic on admission, will repeat now  - Continue calcitrol 0.25 qd  - Will consult Nephrology in AM (home nephrologist Flip Chauhan)  - Started on bicarb 650 BID Stage IV complicated by hyperkalemia and metabolic acidosis   - Will continue to monitor   - Patient was hyperkalemic on admission, repeat improved ?type IV RTA as per renal  -renal U/S intrinsic renal disease no hydro  - Continue calcitrol 0.25 qd   - Started on bicarb 650 BID

## 2017-06-28 NOTE — PROGRESS NOTE ADULT - PROBLEM SELECTOR PLAN 5
- Will hold home Januvia 100  - Will continue conservative Lantus 12 BID instead of home dose of 20 BID, will titrate as needed  - ISS, and FS  - Hemoglobin A1c pending - Will hold home Januvia 100  -  Lantus 16 BID FS above 180 anticipate increased insulin demands given Steroid  - ISS, and FS  - Hemoglobin A1c 7.1

## 2017-06-28 NOTE — PROGRESS NOTE ADULT - PROBLEM SELECTOR PLAN 1
CKD stage 4 pt does not know baseline, follows with Dr. Chauhan, CKD possible sec to DM, baseline could be ~2.1, slightly worsen today, FEna<1%, will hold off on IVF for now consider pt with SOB and LE edema

## 2017-06-28 NOTE — PROGRESS NOTE ADULT - PROBLEM SELECTOR PLAN 4
- Continue home medications hydralazine 25 BID, carvedilol 25 BID, enalapril 20 - Hydralazine increase to 50 TID   monitor BP

## 2017-06-28 NOTE — PROGRESS NOTE ADULT - PROBLEM SELECTOR PLAN 4
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

## 2017-06-29 DIAGNOSIS — R80.9 PROTEINURIA, UNSPECIFIED: ICD-10-CM

## 2017-06-29 LAB
BUN SERPL-MCNC: 40 MG/DL — HIGH (ref 7–23)
CALCIUM SERPL-MCNC: 8.4 MG/DL — SIGNIFICANT CHANGE UP (ref 8.4–10.5)
CHLORIDE SERPL-SCNC: 105 MMOL/L — SIGNIFICANT CHANGE UP (ref 98–107)
CO2 SERPL-SCNC: 21 MMOL/L — LOW (ref 22–31)
CREAT SERPL-MCNC: 2.41 MG/DL — HIGH (ref 0.5–1.3)
GLUCOSE SERPL-MCNC: 135 MG/DL — HIGH (ref 70–99)
HCT VFR BLD CALC: 31.6 % — LOW (ref 34.5–45)
HGB BLD-MCNC: 10.2 G/DL — LOW (ref 11.5–15.5)
MAGNESIUM SERPL-MCNC: 1.3 MG/DL — LOW (ref 1.6–2.6)
MCHC RBC-ENTMCNC: 28.3 PG — SIGNIFICANT CHANGE UP (ref 27–34)
MCHC RBC-ENTMCNC: 32.3 % — SIGNIFICANT CHANGE UP (ref 32–36)
MCV RBC AUTO: 87.5 FL — SIGNIFICANT CHANGE UP (ref 80–100)
NRBC # FLD: 0 — SIGNIFICANT CHANGE UP
PLATELET # BLD AUTO: 157 K/UL — SIGNIFICANT CHANGE UP (ref 150–400)
PMV BLD: 13 FL — SIGNIFICANT CHANGE UP (ref 7–13)
POTASSIUM SERPL-MCNC: 4.4 MMOL/L — SIGNIFICANT CHANGE UP (ref 3.5–5.3)
POTASSIUM SERPL-SCNC: 4.4 MMOL/L — SIGNIFICANT CHANGE UP (ref 3.5–5.3)
RBC # BLD: 3.61 M/UL — LOW (ref 3.8–5.2)
RBC # FLD: 14.3 % — SIGNIFICANT CHANGE UP (ref 10.3–14.5)
SODIUM SERPL-SCNC: 139 MMOL/L — SIGNIFICANT CHANGE UP (ref 135–145)
WBC # BLD: 11.8 K/UL — HIGH (ref 3.8–10.5)
WBC # FLD AUTO: 11.8 K/UL — HIGH (ref 3.8–10.5)

## 2017-06-29 PROCEDURE — 93018 CV STRESS TEST I&R ONLY: CPT | Mod: GC

## 2017-06-29 PROCEDURE — 78452 HT MUSCLE IMAGE SPECT MULT: CPT | Mod: 26

## 2017-06-29 PROCEDURE — 99233 SBSQ HOSP IP/OBS HIGH 50: CPT

## 2017-06-29 PROCEDURE — 93016 CV STRESS TEST SUPVJ ONLY: CPT | Mod: GC

## 2017-06-29 RX ORDER — HYDRALAZINE HCL 50 MG
25 TABLET ORAL ONCE
Qty: 0 | Refills: 0 | Status: COMPLETED | OUTPATIENT
Start: 2017-06-29 | End: 2017-06-29

## 2017-06-29 RX ORDER — CARVEDILOL PHOSPHATE 80 MG/1
1 CAPSULE, EXTENDED RELEASE ORAL
Qty: 0 | Refills: 0 | DISCHARGE
Start: 2017-06-29

## 2017-06-29 RX ORDER — MAGNESIUM OXIDE 400 MG ORAL TABLET 241.3 MG
400 TABLET ORAL
Qty: 0 | Refills: 0 | Status: DISCONTINUED | OUTPATIENT
Start: 2017-06-29 | End: 2017-06-30

## 2017-06-29 RX ORDER — SODIUM BICARBONATE 1 MEQ/ML
1 SYRINGE (ML) INTRAVENOUS
Qty: 60 | Refills: 0
Start: 2017-06-29 | End: 2017-07-29

## 2017-06-29 RX ORDER — CALCITRIOL 0.5 UG/1
1 CAPSULE ORAL
Qty: 0 | Refills: 0 | DISCHARGE
Start: 2017-06-29

## 2017-06-29 RX ORDER — ACETAMINOPHEN 500 MG
650 TABLET ORAL EVERY 6 HOURS
Qty: 0 | Refills: 0 | Status: DISCONTINUED | OUTPATIENT
Start: 2017-06-29 | End: 2017-06-30

## 2017-06-29 RX ORDER — CALCITRIOL 0.5 UG/1
1 CAPSULE ORAL
Qty: 0 | Refills: 0 | COMMUNITY

## 2017-06-29 RX ORDER — ASPIRIN/CALCIUM CARB/MAGNESIUM 324 MG
1 TABLET ORAL
Qty: 0 | Refills: 0 | COMMUNITY

## 2017-06-29 RX ORDER — INSULIN GLARGINE 100 [IU]/ML
20 INJECTION, SOLUTION SUBCUTANEOUS
Qty: 0 | Refills: 0 | DISCHARGE
Start: 2017-06-29

## 2017-06-29 RX ORDER — HYDRALAZINE HCL 50 MG
1 TABLET ORAL
Qty: 0 | Refills: 0 | COMMUNITY

## 2017-06-29 RX ORDER — ALBUTEROL 90 UG/1
2 AEROSOL, METERED ORAL
Qty: 1 | Refills: 0
Start: 2017-06-29 | End: 2017-07-29

## 2017-06-29 RX ORDER — ASPIRIN/CALCIUM CARB/MAGNESIUM 324 MG
1 TABLET ORAL
Qty: 0 | Refills: 0 | DISCHARGE
Start: 2017-06-29

## 2017-06-29 RX ORDER — INSULIN GLARGINE 100 [IU]/ML
20 INJECTION, SOLUTION SUBCUTANEOUS
Qty: 0 | Refills: 0 | Status: DISCONTINUED | OUTPATIENT
Start: 2017-06-29 | End: 2017-06-30

## 2017-06-29 RX ORDER — IPRATROPIUM/ALBUTEROL SULFATE 18-103MCG
3 AEROSOL WITH ADAPTER (GRAM) INHALATION
Qty: 120 | Refills: 0
Start: 2017-06-29 | End: 2017-07-29

## 2017-06-29 RX ORDER — HYDRALAZINE HCL 50 MG
75 TABLET ORAL EVERY 8 HOURS
Qty: 0 | Refills: 0 | Status: DISCONTINUED | OUTPATIENT
Start: 2017-06-29 | End: 2017-06-30

## 2017-06-29 RX ORDER — HYDRALAZINE HCL 50 MG
3 TABLET ORAL
Qty: 270 | Refills: 0 | OUTPATIENT
Start: 2017-06-29 | End: 2017-07-29

## 2017-06-29 RX ADMIN — Medication 650 MILLIGRAM(S): at 05:11

## 2017-06-29 RX ADMIN — CALCITRIOL 0.25 MICROGRAM(S): 0.5 CAPSULE ORAL at 14:27

## 2017-06-29 RX ADMIN — INSULIN GLARGINE 20 UNIT(S): 100 INJECTION, SOLUTION SUBCUTANEOUS at 23:34

## 2017-06-29 RX ADMIN — Medication 650 MILLIGRAM(S): at 17:39

## 2017-06-29 RX ADMIN — Medication 40 MILLIGRAM(S): at 05:06

## 2017-06-29 RX ADMIN — Medication 3 MILLILITER(S): at 04:32

## 2017-06-29 RX ADMIN — Medication 25 MILLIGRAM(S): at 23:55

## 2017-06-29 RX ADMIN — Medication 650 MILLIGRAM(S): at 05:44

## 2017-06-29 RX ADMIN — Medication 3: at 17:39

## 2017-06-29 RX ADMIN — Medication 3: at 14:29

## 2017-06-29 RX ADMIN — HEPARIN SODIUM 5000 UNIT(S): 5000 INJECTION INTRAVENOUS; SUBCUTANEOUS at 05:06

## 2017-06-29 RX ADMIN — Medication 50 MILLIGRAM(S): at 05:06

## 2017-06-29 RX ADMIN — PANTOPRAZOLE SODIUM 40 MILLIGRAM(S): 20 TABLET, DELAYED RELEASE ORAL at 05:07

## 2017-06-29 RX ADMIN — Medication 3 MILLILITER(S): at 15:09

## 2017-06-29 RX ADMIN — Medication 650 MILLIGRAM(S): at 05:15

## 2017-06-29 RX ADMIN — Medication 75 MILLIGRAM(S): at 21:17

## 2017-06-29 RX ADMIN — INSULIN GLARGINE 16 UNIT(S): 100 INJECTION, SOLUTION SUBCUTANEOUS at 08:57

## 2017-06-29 RX ADMIN — Medication 75 MILLIGRAM(S): at 14:28

## 2017-06-29 RX ADMIN — HEPARIN SODIUM 5000 UNIT(S): 5000 INJECTION INTRAVENOUS; SUBCUTANEOUS at 14:28

## 2017-06-29 RX ADMIN — CARVEDILOL PHOSPHATE 25 MILLIGRAM(S): 80 CAPSULE, EXTENDED RELEASE ORAL at 17:39

## 2017-06-29 RX ADMIN — Medication 81 MILLIGRAM(S): at 14:30

## 2017-06-29 NOTE — PROGRESS NOTE ADULT - SUBJECTIVE AND OBJECTIVE BOX
Patient is a 56y old  Female who presents with a chief complaint of fever and chills x 3days (2017 18:22)      SUBJECTIVE / OVERNIGHT EVENTS:    MEDICATIONS  (STANDING):  heparin  Injectable 5000 Unit(s) SubCutaneous every 8 hours  insulin lispro (HumaLOG) corrective regimen sliding scale   SubCutaneous three times a day before meals  insulin lispro (HumaLOG) corrective regimen sliding scale   SubCutaneous at bedtime  dextrose 5%. 1000 milliLiter(s) (50 mL/Hr) IV Continuous <Continuous>  dextrose 50% Injectable 12.5 Gram(s) IV Push once  dextrose 50% Injectable 25 Gram(s) IV Push once  dextrose 50% Injectable 25 Gram(s) IV Push once  ALBUTerol/ipratropium for Nebulization 3 milliLiter(s) Nebulizer every 6 hours  pantoprazole    Tablet 40 milliGRAM(s) Oral before breakfast  calcitriol   Capsule 0.25 MICROGram(s) Oral daily  predniSONE   Tablet 40 milliGRAM(s) Oral daily  sodium bicarbonate 650 milliGRAM(s) Oral every 12 hours  aspirin enteric coated 81 milliGRAM(s) Oral daily  carvedilol 25 milliGRAM(s) Oral every 12 hours  hydrALAZINE 50 milliGRAM(s) Oral every 8 hours  insulin glargine Injectable (LANTUS) 16 Unit(s) SubCutaneous two times a day    MEDICATIONS  (PRN):  dextrose Gel 1 Dose(s) Oral once PRN Blood Glucose LESS THAN 70 milliGRAM(s)/deciliter  glucagon  Injectable 1 milliGRAM(s) IntraMuscular once PRN Glucose LESS THAN 70 milligrams/deciliter  acetaminophen   Tablet. 650 milliGRAM(s) Oral every 6 hours PRN mild to moderate pain      Vital Signs Last 24 Hrs  T(C): 37.1 (17 @ 05:03), Max: 37.1 (17 @ 05:03)  HR: 68 (17 @ 05:03) (60 - 78)  BP: 150/87 (17 @ 05:03) (150/80 - 159/83)  RR: 18 (17 @ 05:03) (16 - 18)  SpO2: 98% (17 @ 05:03) (97% - 99%)  CAPILLARY BLOOD GLUCOSE  136 (2017 08:20)  249 (2017 22:28)  259 (2017 18:18)  259 (2017 12:18)        I&O's Summary    2017 07:01  -  2017 07:00  --------------------------------------------------------  IN: 750 mL / OUT: 0 mL / NET: 750 mL        PHYSICAL EXAM:  GENERAL: NAD, well-developed  HEAD:  Atraumatic, Normocephalic  EYES: EOMI, PERRLA, conjunctiva and sclera clear  NECK: Supple, No JVD  CHEST/LUNG: Clear to auscultation bilaterally; No wheeze  HEART: Regular rate and rhythm; No murmurs, rubs, or gallops  ABDOMEN: Soft, Nontender, Nondistended; Bowel sounds present  EXTREMITIES:  2+ Peripheral Pulses, No clubbing, cyanosis, or edema  PSYCH: AAOx3  NEUROLOGY: non-focal  SKIN: No rashes or lesions    LABS:                        10.2   11.80 )-----------( 157      ( 2017 07:39 )             31.6     06-28    138  |  104  |  39<H>  ----------------------------<  133<H>  4.5   |  19<L>  |  2.42<H>    Ca    8.4      2017 05:30  Phos  3.8     06-27  Mg     1.4     06-28            Urinalysis Basic - ( 2017 02:25 )    Color: PLYEL / Appearance: CLEAR / S.011 / pH: 6.0  Gluc: 500 / Ketone: NEGATIVE  / Bili: NEGATIVE / Urobili: NORMAL E.U.   Blood: NEGATIVE / Protein: 150 / Nitrite: NEGATIVE   Leuk Esterase: NEGATIVE / RBC: 0-2 / WBC 0-2   Sq Epi: OCC / Non Sq Epi: x / Bacteria: FEW        RADIOLOGY & ADDITIONAL TESTS:    Imaging Personally Reviewed:    Consultant(s) Notes Reviewed:      Care Discussed with Consultants/Other Providers: Patient is a 56y old  Female who presents with a chief complaint of fever and chills x 3days (2017 18:22)      SUBJECTIVE / OVERNIGHT EVENTS: Tele NSR Pt in NAD    MEDICATIONS  (STANDING):  heparin  Injectable 5000 Unit(s) SubCutaneous every 8 hours  insulin lispro (HumaLOG) corrective regimen sliding scale   SubCutaneous three times a day before meals  insulin lispro (HumaLOG) corrective regimen sliding scale   SubCutaneous at bedtime  dextrose 5%. 1000 milliLiter(s) (50 mL/Hr) IV Continuous <Continuous>  dextrose 50% Injectable 12.5 Gram(s) IV Push once  dextrose 50% Injectable 25 Gram(s) IV Push once  dextrose 50% Injectable 25 Gram(s) IV Push once  ALBUTerol/ipratropium for Nebulization 3 milliLiter(s) Nebulizer every 6 hours  pantoprazole    Tablet 40 milliGRAM(s) Oral before breakfast  calcitriol   Capsule 0.25 MICROGram(s) Oral daily  predniSONE   Tablet 40 milliGRAM(s) Oral daily  sodium bicarbonate 650 milliGRAM(s) Oral every 12 hours  aspirin enteric coated 81 milliGRAM(s) Oral daily  carvedilol 25 milliGRAM(s) Oral every 12 hours  hydrALAZINE 50 milliGRAM(s) Oral every 8 hours  insulin glargine Injectable (LANTUS) 16 Unit(s) SubCutaneous two times a day    MEDICATIONS  (PRN):  dextrose Gel 1 Dose(s) Oral once PRN Blood Glucose LESS THAN 70 milliGRAM(s)/deciliter  glucagon  Injectable 1 milliGRAM(s) IntraMuscular once PRN Glucose LESS THAN 70 milligrams/deciliter  acetaminophen   Tablet. 650 milliGRAM(s) Oral every 6 hours PRN mild to moderate pain      Vital Signs Last 24 Hrs  T(C): 37.1 (17 @ 05:03), Max: 37.1 (17 @ 05:03)  HR: 68 (17 @ 05:03) (60 - 78)  BP: 150/87 (17 @ 05:03) (150/80 - 159/83)  RR: 18 (17 @ 05:03) (16 - 18)  SpO2: 98% (17 @ 05:03) (97% - 99%)  CAPILLARY BLOOD GLUCOSE  136 (2017 08:20)  249 (2017 22:28)  259 (2017 18:18)  259 (2017 12:18)        I&O's Summary    2017 07:01  -  2017 07:00  --------------------------------------------------------  IN: 750 mL / OUT: 0 mL / NET: 750 mL        PHYSICAL EXAM:  GENERAL: NAD, well-developed  HEAD:  Atraumatic, Normocephalic  EYES: EOMI, PERRLA, conjunctiva and sclera clear  NECK: Supple, No JVD  CHEST/LUNG: Clear to auscultation bilaterally; No wheeze  HEART: Regular rate and rhythm; No murmurs, rubs, or gallops  ABDOMEN: Soft, Nontender, Nondistended; Bowel sounds present  EXTREMITIES:  2+ Peripheral Pulses, No clubbing, cyanosis, or edema  PSYCH: AAOx3  NEUROLOGY: non-focal  SKIN: No rashes or lesions    LABS:                        10.2   11.80 )-----------( 157      ( 2017 07:39 )             31.6     06-28    138  |  104  |  39<H>  ----------------------------<  133<H>  4.5   |  19<L>  |  2.42<H>    Ca    8.4      2017 05:30  Phos  3.8     06-27  Mg     1.4                 Urinalysis Basic - ( 2017 02:25 )    Color: PLYEL / Appearance: CLEAR / S.011 / pH: 6.0  Gluc: 500 / Ketone: NEGATIVE  / Bili: NEGATIVE / Urobili: NORMAL E.U.   Blood: NEGATIVE / Protein: 150 / Nitrite: NEGATIVE   Leuk Esterase: NEGATIVE / RBC: 0-2 / WBC 0-2   Sq Epi: OCC / Non Sq Epi: x / Bacteria: FEW        RADIOLOGY & ADDITIONAL TESTS:    Imaging Personally Reviewed:    Consultant(s) Notes Reviewed:      Care Discussed with Consultants/Other Providers: Patient is a 56y old  Female who presents with a chief complaint of fever and chills x 3days (2017 18:22)      SUBJECTIVE / OVERNIGHT EVENTS: Tele NSR Pt in NAD all other ros neg    MEDICATIONS  (STANDING):  heparin  Injectable 5000 Unit(s) SubCutaneous every 8 hours  insulin lispro (HumaLOG) corrective regimen sliding scale   SubCutaneous three times a day before meals  insulin lispro (HumaLOG) corrective regimen sliding scale   SubCutaneous at bedtime  dextrose 5%. 1000 milliLiter(s) (50 mL/Hr) IV Continuous <Continuous>  dextrose 50% Injectable 12.5 Gram(s) IV Push once  dextrose 50% Injectable 25 Gram(s) IV Push once  dextrose 50% Injectable 25 Gram(s) IV Push once  ALBUTerol/ipratropium for Nebulization 3 milliLiter(s) Nebulizer every 6 hours  pantoprazole    Tablet 40 milliGRAM(s) Oral before breakfast  calcitriol   Capsule 0.25 MICROGram(s) Oral daily  predniSONE   Tablet 40 milliGRAM(s) Oral daily  sodium bicarbonate 650 milliGRAM(s) Oral every 12 hours  aspirin enteric coated 81 milliGRAM(s) Oral daily  carvedilol 25 milliGRAM(s) Oral every 12 hours  hydrALAZINE 50 milliGRAM(s) Oral every 8 hours  insulin glargine Injectable (LANTUS) 16 Unit(s) SubCutaneous two times a day    MEDICATIONS  (PRN):  dextrose Gel 1 Dose(s) Oral once PRN Blood Glucose LESS THAN 70 milliGRAM(s)/deciliter  glucagon  Injectable 1 milliGRAM(s) IntraMuscular once PRN Glucose LESS THAN 70 milligrams/deciliter  acetaminophen   Tablet. 650 milliGRAM(s) Oral every 6 hours PRN mild to moderate pain      Vital Signs Last 24 Hrs  T(C): 37.1 (17 @ 05:03), Max: 37.1 (17 @ 05:03)  HR: 68 (17 @ 05:03) (60 - 78)  BP: 150/87 (17 @ 05:03) (150/80 - 159/83)  RR: 18 (17 @ 05:03) (16 - 18)  SpO2: 98% (17 @ 05:03) (97% - 99%)  CAPILLARY BLOOD GLUCOSE  136 (2017 08:20)  249 (2017 22:28)  259 (2017 18:18)  259 (2017 12:18)        I&O's Summary    2017 07:01  -  2017 07:00  --------------------------------------------------------  IN: 750 mL / OUT: 0 mL / NET: 750 mL        PHYSICAL EXAM:  GENERAL: NAD, well-developed  HEAD:  Atraumatic, Normocephalic  EYES: EOMI, PERRLA, conjunctiva and sclera clear  NECK: Supple, No JVD  CHEST/LUNG: Clear to auscultation bilaterally; No wheeze  HEART: Regular rate and rhythm; No murmurs, rubs, or gallops  ABDOMEN: Soft, Nontender, Nondistended; Bowel sounds present  EXTREMITIES:  2+ Peripheral Pulses, No clubbing, cyanosis, or edema  PSYCH: AAOx3  NEUROLOGY: non-focal  SKIN: No rashes or lesions    LABS:                        10.2   11.80 )-----------( 157      ( 2017 07:39 )             31.6     06-28    138  |  104  |  39<H>  ----------------------------<  133<H>  4.5   |  19<L>  |  2.42<H>    Ca    8.4      2017 05:30  Phos  3.8     06-27  Mg     1.4     -            Urinalysis Basic - ( 2017 02:25 )    Color: PLYEL / Appearance: CLEAR / S.011 / pH: 6.0  Gluc: 500 / Ketone: NEGATIVE  / Bili: NEGATIVE / Urobili: NORMAL E.U.   Blood: NEGATIVE / Protein: 150 / Nitrite: NEGATIVE   Leuk Esterase: NEGATIVE / RBC: 0-2 / WBC 0-2   Sq Epi: OCC / Non Sq Epi: x / Bacteria: FEW        RADIOLOGY & ADDITIONAL TESTS:    Imaging Personally Reviewed:    Consultant(s) Notes Reviewed:      Care Discussed with Consultants/Other Providers:

## 2017-06-29 NOTE — PROGRESS NOTE ADULT - PROBLEM SELECTOR PLAN 1
CKD stage 4 pt does not know baseline, follows with Dr. Chauhan, CKD possible sec to DM, stable could be at baseline

## 2017-06-29 NOTE — DISCHARGE NOTE ADULT - MEDICATION SUMMARY - MEDICATIONS TO CHANGE
I will SWITCH the dose or number of times a day I take the medications listed below when I get home from the hospital:    hydrALAZINE 25 mg oral tablet  -- 1 tab(s) by mouth 2 times a day

## 2017-06-29 NOTE — DISCHARGE NOTE ADULT - HOSPITAL COURSE
57yo Female, ambulatory without assistive devices, hx of HTN, HLD, DM Type2, asthma, CKD presenting with three day history of SOB 2/2 asthma exacerbation, found to have progression of renal disease complicated by hyperkalemia and metabolic acidosis. Also CP at rest, concerned for cardiac etiology given risk factors; Pt. was treated with prednisone, supplemental oxygen and duonebs for asthma exaccerbation.  TTE was done and was NML.  NST was NEG.  Patient was hyperkalemic on admission, repeat improved ?type IV RTA as per renal secondary to long term DM and ACE/ARB--->outpt f/u if need florinef d/w Renal PA  renal U/S intrinsic renal disease no hydro. Pt. was started on bicarb 650 BID. BP and insulin regimen was adjusted for HTN and hyperglycemia.  Pt. will be discharged to home if NST is negative.  D/W attending.

## 2017-06-29 NOTE — PROGRESS NOTE ADULT - PROBLEM SELECTOR PLAN 2
r/o ACS; Resolved after Nitro x3 doses; No acute EKG changes;   -Cardiology c/s in AM for possible ischemic evaluation given risk factors (after acute asthma exacerbation resolves)  -check TTE r/o ACS; Resolved after Nitro x3 doses; No acute EKG changes;   -Cardiology c/s-stress test today  -check TTE-WNL

## 2017-06-29 NOTE — PROGRESS NOTE ADULT - PROBLEM SELECTOR PLAN 3
Stage IV complicated by hyperkalemia and metabolic acidosis   - Will continue to monitor   - Patient was hyperkalemic on admission, repeat improved ?type IV RTA as per renal  -renal U/S intrinsic renal disease no hydro  - Continue calcitrol 0.25 qd   - Started on bicarb 650 BID Stage IV complicated by hyperkalemia and metabolic acidosis   - Patient was hyperkalemic on admission, repeat improved ?type IV RTA as per renal secondary to long term DM and ACE/ARB--->outpt f/u if need florinef d/w Renal PA  -renal U/S intrinsic renal disease no hydro  - Continue calcitrol 0.25 qd   - Started on bicarb 650 BID

## 2017-06-29 NOTE — PROGRESS NOTE ADULT - PROBLEM SELECTOR PLAN 4
improved, TTKG 2.43 suggest Type 4 RTA, ACEI discontinued. Low potassium diet. can monitor K and follow up with nephrologist. florinef might be indicated if K elevates again

## 2017-06-29 NOTE — PROGRESS NOTE ADULT - PROBLEM SELECTOR PLAN 6
DVT ppx: heparin SQ  Diet: CC Diet   Dispo: when medically improved DVT ppx: heparin SQ  Diet: CC Diet   Dispo: discharge home if stress neg

## 2017-06-29 NOTE — DISCHARGE NOTE ADULT - CARE PROVIDER_API CALL
Flip Chauhan), Internal Medicine; Nephrology  [facility] [place] [address]  Phone: [phone]  Fax: [phone]

## 2017-06-29 NOTE — PROGRESS NOTE ADULT - SUBJECTIVE AND OBJECTIVE BOX
INTERVAL HISTORY: Pt is lying in bed comfortable, not in distress, no chest pain noSOB  	  MEDICATIONS:  heparin  Injectable 5000 Unit(s) SubCutaneous every 8 hours  aspirin enteric coated 81 milliGRAM(s) Oral daily  carvedilol 25 milliGRAM(s) Oral every 12 hours  hydrALAZINE 75 milliGRAM(s) Oral every 8 hours      ALBUTerol/ipratropium for Nebulization 3 milliLiter(s) Nebulizer every 6 hours    acetaminophen   Tablet. 650 milliGRAM(s) Oral every 6 hours PRN    pantoprazole    Tablet 40 milliGRAM(s) Oral before breakfast    insulin lispro (HumaLOG) corrective regimen sliding scale   SubCutaneous three times a day before meals  insulin lispro (HumaLOG) corrective regimen sliding scale   SubCutaneous at bedtime  dextrose Gel 1 Dose(s) Oral once PRN  dextrose 50% Injectable 12.5 Gram(s) IV Push once  dextrose 50% Injectable 25 Gram(s) IV Push once  dextrose 50% Injectable 25 Gram(s) IV Push once  glucagon  Injectable 1 milliGRAM(s) IntraMuscular once PRN  predniSONE   Tablet 40 milliGRAM(s) Oral daily  insulin glargine Injectable (LANTUS) 20 Unit(s) SubCutaneous two times a day    dextrose 5%. 1000 milliLiter(s) IV Continuous <Continuous>  calcitriol   Capsule 0.25 MICROGram(s) Oral daily  sodium bicarbonate 650 milliGRAM(s) Oral every 12 hours      PHYSICAL EXAM:  T(C): 36.9 (06-29-17 @ 14:27), Max: 37.1 (06-29-17 @ 05:03)  HR: 66 (06-29-17 @ 17:39) (64 - 77)  BP: 160/88 (06-29-17 @ 17:39) (150/87 - 183/98)  RR: 18 (06-29-17 @ 14:27) (18 - 18)  SpO2: 98% (06-29-17 @ 15:12) (97% - 100%)  Wt(kg): --  I&O's Summary    28 Jun 2017 07:01  -  29 Jun 2017 07:00  --------------------------------------------------------  IN: 750 mL / OUT: 0 mL / NET: 750 mL          Appearance: Normal	  HEENT:   Normal oral mucosa, PERRL, EOMI	  Cardiovascular: Normal S1 S2, No JVD, No murmurs, No edema  Respiratory: b/l rhonchi  Gastrointestinal:  Soft, Non-tender, + BS	  Extremities: Normal range of motion, No clubbing, cyanosis or edema                                    10.2   11.80 )-----------( 157      ( 29 Jun 2017 07:39 )             31.6     06-29    139  |  105  |  40<H>  ----------------------------<  135<H>  4.4   |  21<L>  |  2.41<H>    Ca    8.4      29 Jun 2017 07:30  Mg     1.3     06-29      proBNP:   Lipid Profile:   HgA1c:   TSH:

## 2017-06-29 NOTE — DISCHARGE NOTE ADULT - PLAN OF CARE
prevent exaccerbation continue current meds  Finish Prednisone  F/U with your PMD in 1 week resolved F/U with PMD in 1 week Follow up with your kidney doctor in 1 week low carb diet  continue current meds low salt diet  continue current meds

## 2017-06-29 NOTE — PROGRESS NOTE ADULT - PROBLEM SELECTOR PLAN 1
RVP negative   - Will continue Duonebs q6hrs   - Prednisone 40 PO Day 3/5   - Supplemental oxygen as needed RVP negative   - Will continue Duonebs q6hrs   - Prednisone 40 PO Day 4/5   - Supplemental oxygen as needed

## 2017-06-29 NOTE — DISCHARGE NOTE ADULT - CARE PLAN
Principal Discharge DX:	Asthma exacerbation  Goal:	prevent exaccerbation  Instructions for follow-up, activity and diet:	continue current meds  Finish Prednisone  F/U with your PMD in 1 week  Secondary Diagnosis:	Chest pain, unspecified type  Goal:	resolved  Instructions for follow-up, activity and diet:	F/U with PMD in 1 week  Secondary Diagnosis:	CKD (chronic kidney disease)  Instructions for follow-up, activity and diet:	Follow up with your kidney doctor in 1 week  Secondary Diagnosis:	Diabetes mellitus  Instructions for follow-up, activity and diet:	low carb diet  continue current meds  Secondary Diagnosis:	HTN (hypertension)  Instructions for follow-up, activity and diet:	low salt diet  continue current meds

## 2017-06-29 NOTE — DISCHARGE NOTE ADULT - PATIENT PORTAL LINK FT
“You can access the [facility] Patient Portal, offered by [facility], by registering with the following website: [email or web address]”

## 2017-06-29 NOTE — PROGRESS NOTE ADULT - PROBLEM SELECTOR PLAN 5
- Will hold home Januvia 100  -  Lantus 16 BID FS above 180 anticipate increased insulin demands given Steroid  - ISS, and FS  - Hemoglobin A1c 7.1 -  Lantus  20 BID FS above 180 anticipate increased insulin demands given Steroid  - ISS, and FS  - Hemoglobin A1c 7.1

## 2017-06-29 NOTE — PROGRESS NOTE ADULT - PROBLEM SELECTOR PLAN 4
- Hydralazine increase to 50 TID   monitor BP - Hydralazine increase to 75 TID   monitor BP  [id number]

## 2017-06-29 NOTE — DISCHARGE NOTE ADULT - MEDICATION SUMMARY - MEDICATIONS TO TAKE
I will START or STAY ON the medications listed below when I get home from the hospital:    predniSONE 20 mg oral tablet  -- 2 tab(s) by mouth once a day  -- Indication: For Asthma    aspirin 81 mg oral delayed release tablet  -- 1 tab(s) by mouth once a day  -- Indication: For Preventative    sodium bicarbonate 650 mg oral tablet  -- 1 tab(s) by mouth every 12 hours  -- Indication: For Supplement    insulin glargine  -- 20 unit(s) subcutaneous 2 times a day  -- Indication: For Diabetes mellitus    Januvia 100 mg oral tablet  -- 1 tab(s) by mouth once a day  -- Indication: For Diabetes mellitus    carvedilol 25 mg oral tablet  -- 1 tab(s) by mouth every 12 hours  -- Indication: For HTN (hypertension)    albuterol-ipratropium 2.5 mg-0.5 mg/3 mL inhalation solution  -- 3 milliliter(s) inhaled every 6 hours, As Needed -for shortness of breath and/or wheezing  -- Indication: For Asthma    Ventolin HFA 90 mcg/inh inhalation aerosol  -- 2 puff(s) inhaled every 6 hours, As Needed -for shortness of breath and/or wheezing      Do not use when using nebulizer  -- For inhalation only.  It is very important that you take or use this exactly as directed.  Do not skip doses or discontinue unless directed by your doctor.  Obtain medical advice before taking any non-prescription drugs as some may affect the action of this medication.  Shake well before use.    -- Indication: For Asthma    omeprazole 40 mg oral delayed release capsule  -- 1 cap(s) by mouth once a day  -- Indication: For GERD    hydrALAZINE 25 mg oral tablet  -- 3 tab(s) by mouth every 8 hours  -- Indication: For HTN (hypertension)    calcitriol 0.25 mcg oral capsule  -- 1 cap(s) by mouth once a day  -- Indication: For low calcium I will START or STAY ON the medications listed below when I get home from the hospital:    predniSONE 20 mg oral tablet  -- 2 tab(s) by mouth once a day  -- Indication: For Asthma    aspirin 81 mg oral delayed release tablet  -- 1 tab(s) by mouth once a day  -- Indication: For Preventative    sodium bicarbonate 650 mg oral tablet  -- 1 tab(s) by mouth every 12 hours  -- Indication: For Supplement    insulin glargine  -- 20 unit(s) subcutaneous 2 times a day  -- Indication: For Diabetes mellitus    Januvia 100 mg oral tablet  -- 1 tab(s) by mouth once a day  -- Indication: For Diabetes mellitus    carvedilol 25 mg oral tablet  -- 1 tab(s) by mouth every 12 hours  -- Indication: For HTN (hypertension)    albuterol-ipratropium 2.5 mg-0.5 mg/3 mL inhalation solution  -- 3 milliliter(s) inhaled every 6 hours, As Needed -for shortness of breath and/or wheezing  -- Indication: For Asthma    Ventolin HFA 90 mcg/inh inhalation aerosol  -- 2 puff(s) inhaled every 6 hours, As Needed -for shortness of breath and/or wheezing      Do not use when using nebulizer  -- For inhalation only.  It is very important that you take or use this exactly as directed.  Do not skip doses or discontinue unless directed by your doctor.  Obtain medical advice before taking any non-prescription drugs as some may affect the action of this medication.  Shake well before use.    -- Indication: For Asthma    magnesium oxide 400 mg (241.3 mg elemental magnesium) oral tablet  -- 1 tab(s) by mouth 3 times a day (with meals)  -- Indication: For Supplement    omeprazole 40 mg oral delayed release capsule  -- 1 cap(s) by mouth once a day  -- Indication: For GERD    hydrALAZINE 100 mg oral tablet  -- 1 tab(s) by mouth every 8 hours  -- Indication: For HTN (hypertension)    calcitriol 0.25 mcg oral capsule  -- 1 cap(s) by mouth once a day  -- Indication: For low calcium

## 2017-06-29 NOTE — DISCHARGE NOTE ADULT - MEDICATION SUMMARY - MEDICATIONS TO STOP TAKING
I will STOP taking the medications listed below when I get home from the hospital:    enalapril 20 mg oral tablet  -- 1 tab(s) by mouth once a day

## 2017-06-29 NOTE — PROGRESS NOTE ADULT - SUBJECTIVE AND OBJECTIVE BOX
Patient is a 56y old  Female who presents with a chief complaint of fever and chills x 3days (27 Jun 2017 18:22)      Patient seen and examined at bedside. No chest pain/sob    VITALS:  T(F): 98.7 (06-29-17 @ 05:03), Max: 98.7 (06-29-17 @ 05:03)  HR: 68 (06-29-17 @ 05:03)  BP: 150/87 (06-29-17 @ 05:03)  RR: 18 (06-29-17 @ 05:03)  SpO2: 98% (06-29-17 @ 05:03)  Wt(kg): --    06-28 @ 07:01  -  06-29 @ 07:00  --------------------------------------------------------  IN: 750 mL / OUT: 0 mL / NET: 750 mL          PHYSICAL EXAM:  Constitutional: NAD  Neck: No JVD  Respiratory: CTAB, no wheezes, rales or rhonchi  Cardiovascular: S1, S2, RRR  Gastrointestinal: BS+, soft, NT/ND  Extremities: trace peripheral edema    Hospital Medications:   MEDICATIONS  (STANDING):  heparin  Injectable 5000 Unit(s) SubCutaneous every 8 hours  insulin lispro (HumaLOG) corrective regimen sliding scale   SubCutaneous three times a day before meals  insulin lispro (HumaLOG) corrective regimen sliding scale   SubCutaneous at bedtime  dextrose 5%. 1000 milliLiter(s) (50 mL/Hr) IV Continuous <Continuous>  dextrose 50% Injectable 12.5 Gram(s) IV Push once  dextrose 50% Injectable 25 Gram(s) IV Push once  dextrose 50% Injectable 25 Gram(s) IV Push once  ALBUTerol/ipratropium for Nebulization 3 milliLiter(s) Nebulizer every 6 hours  pantoprazole    Tablet 40 milliGRAM(s) Oral before breakfast  calcitriol   Capsule 0.25 MICROGram(s) Oral daily  predniSONE   Tablet 40 milliGRAM(s) Oral daily  sodium bicarbonate 650 milliGRAM(s) Oral every 12 hours  aspirin enteric coated 81 milliGRAM(s) Oral daily  carvedilol 25 milliGRAM(s) Oral every 12 hours  hydrALAZINE 50 milliGRAM(s) Oral every 8 hours  insulin glargine Injectable (LANTUS) 16 Unit(s) SubCutaneous two times a day      LABS:  06-29    139  |  105  |  40<H>  ----------------------------<  135<H>  4.4   |  21<L>  |  2.41<H>    Ca    8.4      29 Jun 2017 07:30  Phos  3.8     06-27  Mg     1.3     06-29      Creatinine Trend: 2.41 <--, 2.42 <--, 2.34 <--, 2.39 <--, 2.56 <--, 2.67 <--                                10.2   11.80 )-----------( 157      ( 29 Jun 2017 07:39 )             31.6     Urine Studies:  Urinalysis - [06-28-17 @ 02:25]      Color PLYEL / Appearance CLEAR / SG 1.011 / pH 6.0      Gluc 500 / Ketone NEGATIVE  / Bili NEGATIVE / Urobili NORMAL       Blood NEGATIVE / Protein 150 / Leuk Est NEGATIVE / Nitrite NEGATIVE      RBC 0-2 / WBC 0-2 / Hyaline  / Gran  / Sq Epi OCC / Non Sq Epi  / Bacteria FEW    Urine Creatinine 46.68      [06-28-17 @ 02:25]  Urine Protein 139.5      [06-28-17 @ 02:25]  Urine Sodium 96      [06-28-17 @ 02:25]  Urine Potassium 15.3      [06-28-17 @ 02:25]  Urine Osmolality 417      [06-28-17 @ 02:25]    .2 (Ca --)      [06-28-17 @ 05:30]   --  HbA1c 7.1      [06-27-17 @ 03:20]  TSH 0.89      [06-27-17 @ 12:05]  Lipid: chol 161, TG 88, HDL 51, LDL 98      [06-27-17 @ 12:05]        RADIOLOGY & ADDITIONAL STUDIES:

## 2017-06-30 VITALS
OXYGEN SATURATION: 97 % | RESPIRATION RATE: 15 BRPM | DIASTOLIC BLOOD PRESSURE: 90 MMHG | SYSTOLIC BLOOD PRESSURE: 158 MMHG | HEART RATE: 68 BPM

## 2017-06-30 PROCEDURE — 99239 HOSP IP/OBS DSCHRG MGMT >30: CPT

## 2017-06-30 RX ORDER — MAGNESIUM OXIDE 400 MG ORAL TABLET 241.3 MG
1 TABLET ORAL
Qty: 15 | Refills: 0
Start: 2017-06-30 | End: 2017-07-05

## 2017-06-30 RX ORDER — MAGNESIUM OXIDE 400 MG ORAL TABLET 241.3 MG
1 TABLET ORAL
Qty: 90 | Refills: 0 | OUTPATIENT
Start: 2017-06-30 | End: 2017-07-30

## 2017-06-30 RX ORDER — HYDRALAZINE HCL 50 MG
100 TABLET ORAL EVERY 8 HOURS
Qty: 0 | Refills: 0 | Status: DISCONTINUED | OUTPATIENT
Start: 2017-06-30 | End: 2017-06-30

## 2017-06-30 RX ORDER — HYDRALAZINE HCL 50 MG
1 TABLET ORAL
Qty: 90 | Refills: 0
Start: 2017-06-30 | End: 2017-07-30

## 2017-06-30 RX ADMIN — PANTOPRAZOLE SODIUM 40 MILLIGRAM(S): 20 TABLET, DELAYED RELEASE ORAL at 05:48

## 2017-06-30 RX ADMIN — INSULIN GLARGINE 20 UNIT(S): 100 INJECTION, SOLUTION SUBCUTANEOUS at 08:47

## 2017-06-30 RX ADMIN — CARVEDILOL PHOSPHATE 25 MILLIGRAM(S): 80 CAPSULE, EXTENDED RELEASE ORAL at 05:48

## 2017-06-30 RX ADMIN — Medication 650 MILLIGRAM(S): at 05:49

## 2017-06-30 RX ADMIN — MAGNESIUM OXIDE 400 MG ORAL TABLET 400 MILLIGRAM(S): 241.3 TABLET ORAL at 08:47

## 2017-06-30 RX ADMIN — Medication 75 MILLIGRAM(S): at 05:48

## 2017-06-30 RX ADMIN — HEPARIN SODIUM 5000 UNIT(S): 5000 INJECTION INTRAVENOUS; SUBCUTANEOUS at 05:49

## 2017-06-30 RX ADMIN — Medication 3 MILLILITER(S): at 04:42

## 2017-06-30 RX ADMIN — Medication 40 MILLIGRAM(S): at 05:47

## 2017-06-30 NOTE — PROGRESS NOTE ADULT - PROBLEM SELECTOR PROBLEM 6
Chest pain, unspecified type
Need for prophylactic measure

## 2017-06-30 NOTE — PROGRESS NOTE ADULT - SUBJECTIVE AND OBJECTIVE BOX
Patient is a 56y old  Female who presents with a chief complaint of fever and chills x 3days (29 Jun 2017 16:50)      Patient seen and examined at bedside. No chest pain/sob    VITALS:  T(F): 98.5 (06-30-17 @ 05:46), Max: 98.5 (06-29-17 @ 14:27)  HR: 68 (06-30-17 @ 08:48)  BP: 158/90 (06-30-17 @ 08:48)  RR: 15 (06-30-17 @ 08:48)  SpO2: 97% (06-30-17 @ 08:48)  Wt(kg): --        PHYSICAL EXAM:  Constitutional: NAD  Neck: No JVD  Respiratory: CTAB, no wheezes, rales or rhonchi  Cardiovascular: S1, S2, RRR  Gastrointestinal: BS+, soft, NT/ND  Extremities: 2+ peripheral edema    Hospital Medications:   MEDICATIONS  (STANDING):  heparin  Injectable 5000 Unit(s) SubCutaneous every 8 hours  insulin lispro (HumaLOG) corrective regimen sliding scale   SubCutaneous three times a day before meals  insulin lispro (HumaLOG) corrective regimen sliding scale   SubCutaneous at bedtime  dextrose 5%. 1000 milliLiter(s) (50 mL/Hr) IV Continuous <Continuous>  dextrose 50% Injectable 12.5 Gram(s) IV Push once  dextrose 50% Injectable 25 Gram(s) IV Push once  dextrose 50% Injectable 25 Gram(s) IV Push once  ALBUTerol/ipratropium for Nebulization 3 milliLiter(s) Nebulizer every 6 hours  pantoprazole    Tablet 40 milliGRAM(s) Oral before breakfast  calcitriol   Capsule 0.25 MICROGram(s) Oral daily  sodium bicarbonate 650 milliGRAM(s) Oral every 12 hours  aspirin enteric coated 81 milliGRAM(s) Oral daily  carvedilol 25 milliGRAM(s) Oral every 12 hours  insulin glargine Injectable (LANTUS) 20 Unit(s) SubCutaneous two times a day  magnesium oxide 400 milliGRAM(s) Oral three times a day with meals  hydrALAZINE 100 milliGRAM(s) Oral every 8 hours      LABS:  06-29    139  |  105  |  40<H>  ----------------------------<  135<H>  4.4   |  21<L>  |  2.41<H>    Ca    8.4      29 Jun 2017 07:30  Mg     1.3     06-29      Creatinine Trend: 2.41 <--, 2.42 <--, 2.34 <--, 2.39 <--, 2.56 <--, 2.67 <--                                10.2   11.80 )-----------( 157      ( 29 Jun 2017 07:39 )             31.6     Urine Studies:  Urinalysis - [06-28-17 @ 02:25]      Color PLYEL / Appearance CLEAR / SG 1.011 / pH 6.0      Gluc 500 / Ketone NEGATIVE  / Bili NEGATIVE / Urobili NORMAL       Blood NEGATIVE / Protein 150 / Leuk Est NEGATIVE / Nitrite NEGATIVE      RBC 0-2 / WBC 0-2 / Hyaline  / Gran  / Sq Epi OCC / Non Sq Epi  / Bacteria FEW    Urine Creatinine 46.68      [06-28-17 @ 02:25]  Urine Protein 139.5      [06-28-17 @ 02:25]  Urine Sodium 96      [06-28-17 @ 02:25]  Urine Potassium 15.3      [06-28-17 @ 02:25]  Urine Osmolality 417      [06-28-17 @ 02:25]    .2 (Ca --)      [06-28-17 @ 05:30]   --  HbA1c 7.1      [06-27-17 @ 03:20]  TSH 0.89      [06-27-17 @ 12:05]  Lipid: chol 161, TG 88, HDL 51, LDL 98      [06-27-17 @ 12:05]        RADIOLOGY & ADDITIONAL STUDIES:

## 2017-06-30 NOTE — PROGRESS NOTE ADULT - SUBJECTIVE AND OBJECTIVE BOX
Patient is a 56y old  Female who presents with a chief complaint of fever and chills x 3days (29 Jun 2017 16:50)      SUBJECTIVE / OVERNIGHT EVENTS:    MEDICATIONS  (STANDING):  heparin  Injectable 5000 Unit(s) SubCutaneous every 8 hours  insulin lispro (HumaLOG) corrective regimen sliding scale   SubCutaneous three times a day before meals  insulin lispro (HumaLOG) corrective regimen sliding scale   SubCutaneous at bedtime  dextrose 5%. 1000 milliLiter(s) (50 mL/Hr) IV Continuous <Continuous>  dextrose 50% Injectable 12.5 Gram(s) IV Push once  dextrose 50% Injectable 25 Gram(s) IV Push once  dextrose 50% Injectable 25 Gram(s) IV Push once  ALBUTerol/ipratropium for Nebulization 3 milliLiter(s) Nebulizer every 6 hours  pantoprazole    Tablet 40 milliGRAM(s) Oral before breakfast  calcitriol   Capsule 0.25 MICROGram(s) Oral daily  sodium bicarbonate 650 milliGRAM(s) Oral every 12 hours  aspirin enteric coated 81 milliGRAM(s) Oral daily  carvedilol 25 milliGRAM(s) Oral every 12 hours  hydrALAZINE 75 milliGRAM(s) Oral every 8 hours  insulin glargine Injectable (LANTUS) 20 Unit(s) SubCutaneous two times a day  magnesium oxide 400 milliGRAM(s) Oral three times a day with meals    MEDICATIONS  (PRN):  dextrose Gel 1 Dose(s) Oral once PRN Blood Glucose LESS THAN 70 milliGRAM(s)/deciliter  glucagon  Injectable 1 milliGRAM(s) IntraMuscular once PRN Glucose LESS THAN 70 milligrams/deciliter  acetaminophen   Tablet. 650 milliGRAM(s) Oral every 6 hours PRN mild to moderate pain      Vital Signs Last 24 Hrs  T(C): 36.9 (06-30-17 @ 05:46), Max: 36.9 (06-29-17 @ 14:27)  HR: 84 (06-30-17 @ 05:46) (63 - 84)  BP: 153/66 (06-30-17 @ 05:46) (153/66 - 183/98)  RR: 18 (06-30-17 @ 05:46) (18 - 18)  SpO2: 98% (06-30-17 @ 05:46) (97% - 100%)  CAPILLARY BLOOD GLUCOSE  118 (30 Jun 2017 08:31)  222 (29 Jun 2017 23:29)  286 (29 Jun 2017 17:05)  282 (29 Jun 2017 14:27)        I&O's Summary      PHYSICAL EXAM:  GENERAL: NAD, well-developed  HEAD:  Atraumatic, Normocephalic  EYES: EOMI, PERRLA, conjunctiva and sclera clear  NECK: Supple, No JVD  CHEST/LUNG: Clear to auscultation bilaterally; No wheeze  HEART: Regular rate and rhythm; No murmurs, rubs, or gallops  ABDOMEN: Soft, Nontender, Nondistended; Bowel sounds present  EXTREMITIES:  2+ Peripheral Pulses, No clubbing, cyanosis, or edema  PSYCH: AAOx3  NEUROLOGY: non-focal  SKIN: No rashes or lesions    LABS:                        10.2   11.80 )-----------( 157      ( 29 Jun 2017 07:39 )             31.6     06-29    139  |  105  |  40<H>  ----------------------------<  135<H>  4.4   |  21<L>  |  2.41<H>    Ca    8.4      29 Jun 2017 07:30  Mg     1.3     06-29                RADIOLOGY & ADDITIONAL TESTS:    Imaging Personally Reviewed:    Consultant(s) Notes Reviewed:      Care Discussed with Consultants/Other Providers: Patient is a 56y old  Female who presents with a chief complaint of fever and chills x 3days (29 Jun 2017 16:50)      SUBJECTIVE / OVERNIGHT EVENTS: Pt BP elevated ON prior to discharge and discharge was held given hydralazine 25 po x1. Pt feels well this am. denies SOB/N/V. Tele NSR. all other review of systems neg    MEDICATIONS  (STANDING):  heparin  Injectable 5000 Unit(s) SubCutaneous every 8 hours  insulin lispro (HumaLOG) corrective regimen sliding scale   SubCutaneous three times a day before meals  insulin lispro (HumaLOG) corrective regimen sliding scale   SubCutaneous at bedtime  dextrose 5%. 1000 milliLiter(s) (50 mL/Hr) IV Continuous <Continuous>  dextrose 50% Injectable 12.5 Gram(s) IV Push once  dextrose 50% Injectable 25 Gram(s) IV Push once  dextrose 50% Injectable 25 Gram(s) IV Push once  ALBUTerol/ipratropium for Nebulization 3 milliLiter(s) Nebulizer every 6 hours  pantoprazole    Tablet 40 milliGRAM(s) Oral before breakfast  calcitriol   Capsule 0.25 MICROGram(s) Oral daily  sodium bicarbonate 650 milliGRAM(s) Oral every 12 hours  aspirin enteric coated 81 milliGRAM(s) Oral daily  carvedilol 25 milliGRAM(s) Oral every 12 hours  hydrALAZINE 75 milliGRAM(s) Oral every 8 hours  insulin glargine Injectable (LANTUS) 20 Unit(s) SubCutaneous two times a day  magnesium oxide 400 milliGRAM(s) Oral three times a day with meals    MEDICATIONS  (PRN):  dextrose Gel 1 Dose(s) Oral once PRN Blood Glucose LESS THAN 70 milliGRAM(s)/deciliter  glucagon  Injectable 1 milliGRAM(s) IntraMuscular once PRN Glucose LESS THAN 70 milligrams/deciliter  acetaminophen   Tablet. 650 milliGRAM(s) Oral every 6 hours PRN mild to moderate pain      Vital Signs Last 24 Hrs  T(C): 36.9 (06-30-17 @ 05:46), Max: 36.9 (06-29-17 @ 14:27)  HR: 84 (06-30-17 @ 05:46) (63 - 84)  BP: 153/66 (06-30-17 @ 05:46) (153/66 - 183/98)  RR: 18 (06-30-17 @ 05:46) (18 - 18)  SpO2: 98% (06-30-17 @ 05:46) (97% - 100%)  CAPILLARY BLOOD GLUCOSE  118 (30 Jun 2017 08:31)  222 (29 Jun 2017 23:29)  286 (29 Jun 2017 17:05)  282 (29 Jun 2017 14:27)        I&O's Summary      PHYSICAL EXAM:  GENERAL: NAD, well-developed  HEAD:  Atraumatic, Normocephalic  EYES: EOMI, PERRLA, conjunctiva and sclera clear  NECK: Supple, No JVD  CHEST/LUNG: Clear to auscultation bilaterally; No wheeze  HEART: Regular rate and rhythm; No murmurs, rubs, or gallops  ABDOMEN: Soft, Nontender, Nondistended; Bowel sounds present  EXTREMITIES:  2+ Peripheral Pulses, No clubbing, cyanosis, or edema  PSYCH: AAOx3  NEUROLOGY: non-focal  SKIN: No rashes or lesions    LABS:                        10.2   11.80 )-----------( 157      ( 29 Jun 2017 07:39 )             31.6     06-29    139  |  105  |  40<H>  ----------------------------<  135<H>  4.4   |  21<L>  |  2.41<H>    Ca    8.4      29 Jun 2017 07:30  Mg     1.3     06-29                RADIOLOGY & ADDITIONAL TESTS:    Imaging Personally Reviewed:Stress test - neg for ischemia    Consultant(s) Notes Reviewed:      Care Discussed with Consultants/Other Providers:

## 2017-06-30 NOTE — PROGRESS NOTE ADULT - PROBLEM SELECTOR PROBLEM 3
Diabetes mellitus
CKD (chronic kidney disease)
Diabetes mellitus
Diabetes mellitus

## 2017-06-30 NOTE — PROGRESS NOTE ADULT - PROBLEM SELECTOR PROBLEM 2
HTN (hypertension)
Chest pain, unspecified type
HTN (hypertension)
HTN (hypertension)

## 2017-06-30 NOTE — PROGRESS NOTE ADULT - ASSESSMENT
57yo Female, ambulatory without assistive devices, hx of HTN, HLD, DM Type2, asthma, CKD presenting with three day history of SOB and chest pain
55yo Female, ambulatory without assistive devices, hx of HTN, HLD, DM Type2, asthma, CKD presenting with three day history of SOB and chest pain
57yo Female, ambulatory without assistive devices, hx of HTN, HLD, DM Type2, asthma, CKD presenting with three day history of SOB 2/2 asthma exacerbation, found to have progression of renal disease complicated by hyperkalemia and metabolic acidosis. Also CP at rest, concerned for cardiac etiology given risk factors;
57yo Female, ambulatory without assistive devices, hx of HTN, HLD, DM Type2, asthma, CKD presenting with three day history of SOB and chest pain
EKG - NSR @ 82 bpm no STT changes  CE - negative     a/p     1) Chest pain - atypical in nature, likely secondary to uncontrolled HTN, will get 2d echo shows normal LV, stress test negative    2) CKD -  stable     3) Asthma exacerbation -  continue prednisone and albuterol    4) HTN urgency -  cont coreg and hydralazine
EKG - NSR @ 82 bpm no STT changes  CE - negative     a/p     1) Chest pain - atypical in nature, likely secondary to uncontrolled HTN, will get 2d echo shows normal LV, stress test negative, for discharge home    2) CKD -  stable     3) Asthma exacerbation -  continue  albuterol    4) HTN urgency -  cont coreg and hydralazine
EKG - NSR @ 82 bpm no STT changes  CE - negative     a/p     1) Chest pain - atypical in nature, likely secondary to uncontrolled HTN, will get 2d echo to access LV function, stress test after asthma exacerbation improves    2) CKD -  stable     3) Asthma exacerbation -  continue prednisone and albuterol    4) HTN urgency - BP improving in 160 cont coreg and hydralazine, increase hydralazine to 50mg q8

## 2017-06-30 NOTE — PROGRESS NOTE ADULT - PROBLEM SELECTOR PLAN 1
RVP negative   - Will continue Duonebs q6hrs   - Prednisone 40 PO Day 4/5   - Supplemental oxygen as needed RVP negative   - Will continue Duonebs q6hrs   - Prednisone 40 PO Day 5/5   - Supplemental oxygen as needed

## 2017-06-30 NOTE — PROGRESS NOTE ADULT - PROBLEM SELECTOR PLAN 5
-  Lantus  20 BID FS above 180 anticipate increased insulin demands given Steroid  - ISS, and FS  - Hemoglobin A1c 7.1

## 2017-06-30 NOTE — PROGRESS NOTE ADULT - PROBLEM SELECTOR PLAN 4
- Hydralazine increase to 75 TID   monitor BP  -[id number]'s - Hydralazine increase to 100 TID   monitor BP  -[id number]'s

## 2017-06-30 NOTE — PROGRESS NOTE ADULT - SUBJECTIVE AND OBJECTIVE BOX
INTERVAL HISTORY: Pt is lying in bed comfortable, not in distress, no chest pain noSOB  	  MEDICATIONS:  heparin  Injectable 5000 Unit(s) SubCutaneous every 8 hours  aspirin enteric coated 81 milliGRAM(s) Oral daily  carvedilol 25 milliGRAM(s) Oral every 12 hours  hydrALAZINE 100 milliGRAM(s) Oral every 8 hours  ALBUTerol/ipratropium for Nebulization 3 milliLiter(s) Nebulizer every 6 hours  acetaminophen   Tablet. 650 milliGRAM(s) Oral every 6 hours PRN  pantoprazole    Tablet 40 milliGRAM(s) Oral before breakfast  insulin lispro (HumaLOG) corrective regimen sliding scale   SubCutaneous three times a day before meals  insulin lispro (HumaLOG) corrective regimen sliding scale   SubCutaneous at bedtime  dextrose Gel 1 Dose(s) Oral once PRN  dextrose 50% Injectable 12.5 Gram(s) IV Push once  dextrose 50% Injectable 25 Gram(s) IV Push once  dextrose 50% Injectable 25 Gram(s) IV Push once  glucagon  Injectable 1 milliGRAM(s) IntraMuscular once PRN  insulin glargine Injectable (LANTUS) 20 Unit(s) SubCutaneous two times a day  dextrose 5%. 1000 milliLiter(s) IV Continuous <Continuous>  calcitriol   Capsule 0.25 MICROGram(s) Oral daily  sodium bicarbonate 650 milliGRAM(s) Oral every 12 hours  magnesium oxide 400 milliGRAM(s) Oral three times a day with meals      PHYSICAL EXAM:  T(C): 36.9 (06-30-17 @ 05:46), Max: 36.9 (06-29-17 @ 14:27)  HR: 68 (06-30-17 @ 08:48) (63 - 84)  BP: 158/90 (06-30-17 @ 08:48) (153/66 - 183/98)  RR: 15 (06-30-17 @ 08:48) (15 - 18)  SpO2: 97% (06-30-17 @ 08:48) (97% - 100%)  Wt(kg): --  I&O's Summary        Appearance: Normal	  HEENT:   Normal oral mucosa, PERRL, EOMI	  Cardiovascular: Normal S1 S2, No JVD, No murmurs, No edema  Respiratory: mild rhonchi b/l  Gastrointestinal:  Soft, Non-tender, + BS	  Extremities: no edema                                    10.2   11.80 )-----------( 157      ( 29 Jun 2017 07:39 )             31.6     06-29    139  |  105  |  40<H>  ----------------------------<  135<H>  4.4   |  21<L>  |  2.41<H>    Ca    8.4      29 Jun 2017 07:30  Mg     1.3     06-29      proBNP:   Lipid Profile:   HgA1c:   TSH:

## 2017-06-30 NOTE — PROGRESS NOTE ADULT - PROBLEM SELECTOR PLAN 2
r/o ACS; Resolved after Nitro x3 doses; No acute EKG changes;   -Cardiology c/s-stress test today  -check TTE-WNL r/o ACS; Resolved after Nitro x3 doses; No acute EKG changes;   -Cardiology -neg  -check TTE-WNL

## 2017-06-30 NOTE — PROGRESS NOTE ADULT - PROBLEM SELECTOR PLAN 3
Stage IV complicated by hyperkalemia and metabolic acidosis   - Patient was hyperkalemic on admission, repeat improved ?type IV RTA as per renal secondary to long term DM and ACE/ARB--->outpt f/u if need florinef d/w Renal PA  -renal U/S intrinsic renal disease no hydro  - Continue calcitrol 0.25 qd   - Started on bicarb 650 BID

## 2017-06-30 NOTE — PROGRESS NOTE ADULT - PROBLEM SELECTOR PROBLEM 9
Hyperparathyroidism, secondary renal

## 2017-08-01 PROCEDURE — G9001: CPT

## 2019-06-27 ENCOUNTER — APPOINTMENT (OUTPATIENT)
Dept: VASCULAR SURGERY | Facility: CLINIC | Age: 59
End: 2019-06-27
Payer: MEDICAID

## 2019-06-27 VITALS
SYSTOLIC BLOOD PRESSURE: 193 MMHG | HEART RATE: 89 BPM | HEIGHT: 61 IN | WEIGHT: 212 LBS | DIASTOLIC BLOOD PRESSURE: 100 MMHG | TEMPERATURE: 98 F | BODY MASS INDEX: 40.02 KG/M2

## 2019-06-27 PROCEDURE — 99203 OFFICE O/P NEW LOW 30 MIN: CPT

## 2019-06-27 PROCEDURE — G0365: CPT

## 2019-06-27 PROCEDURE — 93971 EXTREMITY STUDY: CPT

## 2019-06-27 NOTE — REASON FOR VISIT
[Consultation] : a consultation visit [FreeTextEntry1] : Consultation for hemodialysis access creation oriented to person, place, time and situation

## 2019-06-27 NOTE — HISTORY OF PRESENT ILLNESS
[FreeTextEntry1] : 58-year-old female with a history of hypertension and diabetes presents to the office with worsening renal function. Patient is not yet on hemodialysis. Patient is currently approaching hemodialysis therefore, it was sent to the office to evaluate for hemodialysis access. Patient is right-handed.

## 2019-06-27 NOTE — PHYSICAL EXAM
[Normal Breath Sounds] : Normal breath sounds [Normal Rate and Rhythm] : normal rate and rhythm [2+] : left 2+ [No Rash or Lesion] : No rash or lesion [Alert] : alert [Calm] : calm [JVD] : no jugular venous distention  [Varicose Veins Of Lower Extremities] : not present [Ankle Swelling (On Exam)] : not present [] : not present [Abdomen Tenderness] : ~T ~M No abdominal tenderness [de-identified] : appears well/obese

## 2019-06-27 NOTE — ASSESSMENT
[FreeTextEntry1] : Patient underwent left arm vein mapping which shows Adequate cephalic vein beginning at the left wrist. We will schedule patient for left radiocephalic AV fistula

## 2019-07-10 ENCOUNTER — OUTPATIENT (OUTPATIENT)
Dept: OUTPATIENT SERVICES | Facility: HOSPITAL | Age: 59
LOS: 1 days | End: 2019-07-10
Payer: MEDICAID

## 2019-07-10 VITALS
OXYGEN SATURATION: 97 % | HEART RATE: 72 BPM | HEIGHT: 61 IN | SYSTOLIC BLOOD PRESSURE: 176 MMHG | DIASTOLIC BLOOD PRESSURE: 96 MMHG | TEMPERATURE: 98 F | RESPIRATION RATE: 18 BRPM | WEIGHT: 214.07 LBS

## 2019-07-10 DIAGNOSIS — Z98.890 OTHER SPECIFIED POSTPROCEDURAL STATES: Chronic | ICD-10-CM

## 2019-07-10 DIAGNOSIS — E11.9 TYPE 2 DIABETES MELLITUS WITHOUT COMPLICATIONS: ICD-10-CM

## 2019-07-10 DIAGNOSIS — G47.33 OBSTRUCTIVE SLEEP APNEA (ADULT) (PEDIATRIC): ICD-10-CM

## 2019-07-10 DIAGNOSIS — N18.6 END STAGE RENAL DISEASE: ICD-10-CM

## 2019-07-10 DIAGNOSIS — I10 ESSENTIAL (PRIMARY) HYPERTENSION: ICD-10-CM

## 2019-07-10 LAB
ANION GAP SERPL CALC-SCNC: 18 MMOL/L — HIGH (ref 5–17)
BUN SERPL-MCNC: 72 MG/DL — HIGH (ref 7–23)
CALCIUM SERPL-MCNC: 9.3 MG/DL — SIGNIFICANT CHANGE UP (ref 8.4–10.5)
CHLORIDE SERPL-SCNC: 103 MMOL/L — SIGNIFICANT CHANGE UP (ref 96–108)
CO2 SERPL-SCNC: 17 MMOL/L — LOW (ref 22–31)
CREAT SERPL-MCNC: 6.89 MG/DL — HIGH (ref 0.5–1.3)
GLUCOSE SERPL-MCNC: 154 MG/DL — HIGH (ref 70–99)
HBA1C BLD-MCNC: 6.8 % — HIGH (ref 4–5.6)
HCT VFR BLD CALC: 38.1 % — SIGNIFICANT CHANGE UP (ref 34.5–45)
HGB BLD-MCNC: 11.4 G/DL — LOW (ref 11.5–15.5)
MCHC RBC-ENTMCNC: 29 PG — SIGNIFICANT CHANGE UP (ref 27–34)
MCHC RBC-ENTMCNC: 29.9 GM/DL — LOW (ref 32–36)
MCV RBC AUTO: 96.9 FL — SIGNIFICANT CHANGE UP (ref 80–100)
PLATELET # BLD AUTO: 230 K/UL — SIGNIFICANT CHANGE UP (ref 150–400)
POTASSIUM SERPL-MCNC: 5.4 MMOL/L — HIGH (ref 3.5–5.3)
POTASSIUM SERPL-SCNC: 5.4 MMOL/L — HIGH (ref 3.5–5.3)
RBC # BLD: 3.93 M/UL — SIGNIFICANT CHANGE UP (ref 3.8–5.2)
RBC # FLD: 14.6 % — HIGH (ref 10.3–14.5)
SODIUM SERPL-SCNC: 138 MMOL/L — SIGNIFICANT CHANGE UP (ref 135–145)
WBC # BLD: 9.05 K/UL — SIGNIFICANT CHANGE UP (ref 3.8–10.5)
WBC # FLD AUTO: 9.05 K/UL — SIGNIFICANT CHANGE UP (ref 3.8–10.5)

## 2019-07-10 PROCEDURE — G0463: CPT

## 2019-07-10 PROCEDURE — 85027 COMPLETE CBC AUTOMATED: CPT

## 2019-07-10 PROCEDURE — 80048 BASIC METABOLIC PNL TOTAL CA: CPT

## 2019-07-10 PROCEDURE — 83036 HEMOGLOBIN GLYCOSYLATED A1C: CPT

## 2019-07-10 RX ORDER — CEFAZOLIN SODIUM 1 G
2000 VIAL (EA) INJECTION ONCE
Refills: 0 | Status: DISCONTINUED | OUTPATIENT
Start: 2019-07-17 | End: 2019-08-01

## 2019-07-10 RX ORDER — CHLORHEXIDINE GLUCONATE 213 G/1000ML
1 SOLUTION TOPICAL ONCE
Refills: 0 | Status: DISCONTINUED | OUTPATIENT
Start: 2019-07-17 | End: 2019-08-01

## 2019-07-10 NOTE — H&P PST ADULT - HISTORY OF PRESENT ILLNESS
58yr old female presents to PST for a Creation of Left Radiocephalic Arteriovenous Fistula on 7/17/2019 for ESRD (no HD as of yet. PMH: HTN, HLD, DM2 (insulin) GERD, BRADY (confirmed, no longer uses CPAP due to insurance coverage), Asthma (states she has not used an inhaler in a "very long time") and Obesity (BMI 40.4). Pt denies chest pain or SOB and feels well otherwise. Medical evaluation completed and in chart. As per pt having Stress & Echo testing on 7/15/2019.

## 2019-07-10 NOTE — H&P PST ADULT - NSICDXPASTMEDICALHX_GEN_ALL_CORE_FT
PAST MEDICAL HISTORY:  Asthma     Diabetes mellitus Type 2    End-stage renal disease     Hyperlipidemia     Hypertension     Obesity BMI 40.4    BRADY (obstructive sleep apnea) No longer has CPAP machine (insurance coverage issue)

## 2019-07-10 NOTE — H&P PST ADULT - NSICDXPROBLEM_GEN_ALL_CORE_FT
PROBLEM DIAGNOSES  Problem: DM2 (diabetes mellitus, type 2)  Assessment and Plan: Pt instructed to check FS morning od surgery.     Problem: Hypertension  Assessment and Plan: Pt to continue oral medications as prescribed.     Problem: BRADY (obstructive sleep apnea)  Assessment and Plan: Pt states no longer has CPAP due to insurance coverage. OR booking made aware.     Problem: End-stage renal disease  Assessment and Plan: Pt scheduled for sx on 7/17/2019. Surgical and chlorhexidine instructions reviewed w/ pt.

## 2019-07-16 ENCOUNTER — TRANSCRIPTION ENCOUNTER (OUTPATIENT)
Age: 59
End: 2019-07-16

## 2019-07-17 ENCOUNTER — APPOINTMENT (OUTPATIENT)
Dept: VASCULAR SURGERY | Facility: HOSPITAL | Age: 59
End: 2019-07-17
Payer: MEDICAID

## 2019-07-17 ENCOUNTER — OUTPATIENT (OUTPATIENT)
Dept: OUTPATIENT SERVICES | Facility: HOSPITAL | Age: 59
LOS: 1 days | End: 2019-07-17
Payer: MEDICAID

## 2019-07-17 VITALS
SYSTOLIC BLOOD PRESSURE: 138 MMHG | TEMPERATURE: 98 F | RESPIRATION RATE: 18 BRPM | DIASTOLIC BLOOD PRESSURE: 75 MMHG | OXYGEN SATURATION: 99 % | HEART RATE: 60 BPM

## 2019-07-17 VITALS
RESPIRATION RATE: 18 BRPM | OXYGEN SATURATION: 98 % | TEMPERATURE: 98 F | DIASTOLIC BLOOD PRESSURE: 83 MMHG | HEART RATE: 71 BPM | SYSTOLIC BLOOD PRESSURE: 146 MMHG

## 2019-07-17 DIAGNOSIS — Z98.890 OTHER SPECIFIED POSTPROCEDURAL STATES: Chronic | ICD-10-CM

## 2019-07-17 DIAGNOSIS — N18.6 END STAGE RENAL DISEASE: ICD-10-CM

## 2019-07-17 LAB
GLUCOSE BLDC GLUCOMTR-MCNC: 108 MG/DL — HIGH (ref 70–99)
GLUCOSE BLDC GLUCOMTR-MCNC: 78 MG/DL — SIGNIFICANT CHANGE UP (ref 70–99)
POTASSIUM SERPL-MCNC: 5.7 MMOL/L — HIGH (ref 3.5–5.3)
POTASSIUM SERPL-SCNC: 5.7 MMOL/L — HIGH (ref 3.5–5.3)

## 2019-07-17 PROCEDURE — 84132 ASSAY OF SERUM POTASSIUM: CPT

## 2019-07-17 PROCEDURE — 82962 GLUCOSE BLOOD TEST: CPT

## 2019-07-17 PROCEDURE — C1889: CPT

## 2019-07-17 PROCEDURE — 36820 AV FUSION/FOREARM VEIN: CPT | Mod: LT

## 2019-07-17 PROCEDURE — 36818 AV FUSE UPPR ARM CEPHALIC: CPT

## 2019-07-17 RX ORDER — SODIUM CHLORIDE 9 MG/ML
3 INJECTION INTRAMUSCULAR; INTRAVENOUS; SUBCUTANEOUS EVERY 8 HOURS
Refills: 0 | Status: DISCONTINUED | OUTPATIENT
Start: 2019-07-17 | End: 2019-07-17

## 2019-07-17 RX ORDER — LIDOCAINE HCL 20 MG/ML
0.2 VIAL (ML) INJECTION ONCE
Refills: 0 | Status: DISCONTINUED | OUTPATIENT
Start: 2019-07-17 | End: 2019-07-17

## 2019-07-17 RX ORDER — FUROSEMIDE 40 MG
1 TABLET ORAL
Qty: 0 | Refills: 0 | DISCHARGE

## 2019-07-17 RX ORDER — ACETAMINOPHEN 500 MG
1000 TABLET ORAL ONCE
Refills: 0 | Status: COMPLETED | OUTPATIENT
Start: 2019-07-17 | End: 2019-07-17

## 2019-07-17 RX ORDER — INSULIN LISPRO 100/ML
VIAL (ML) SUBCUTANEOUS
Refills: 0 | Status: DISCONTINUED | OUTPATIENT
Start: 2019-07-17 | End: 2019-08-01

## 2019-07-17 RX ORDER — OMEPRAZOLE 10 MG/1
1 CAPSULE, DELAYED RELEASE ORAL
Qty: 0 | Refills: 0 | DISCHARGE

## 2019-07-17 RX ORDER — ONDANSETRON 8 MG/1
4 TABLET, FILM COATED ORAL ONCE
Refills: 0 | Status: DISCONTINUED | OUTPATIENT
Start: 2019-07-17 | End: 2019-07-17

## 2019-07-17 RX ORDER — INSULIN HUMAN 100 [IU]/ML
4 INJECTION, SOLUTION SUBCUTANEOUS
Qty: 0 | Refills: 0 | DISCHARGE

## 2019-07-17 RX ORDER — DEXTROSE 50 % IN WATER 50 %
25 SYRINGE (ML) INTRAVENOUS ONCE
Refills: 0 | Status: DISCONTINUED | OUTPATIENT
Start: 2019-07-17 | End: 2019-08-01

## 2019-07-17 RX ORDER — PATIROMER 8.4 G/1
1 POWDER, FOR SUSPENSION ORAL
Qty: 0 | Refills: 0 | DISCHARGE

## 2019-07-17 RX ORDER — DEXTROSE 50 % IN WATER 50 %
15 SYRINGE (ML) INTRAVENOUS ONCE
Refills: 0 | Status: DISCONTINUED | OUTPATIENT
Start: 2019-07-17 | End: 2019-08-01

## 2019-07-17 RX ORDER — CALCIUM ACETATE 667 MG
2 TABLET ORAL
Qty: 0 | Refills: 0 | DISCHARGE

## 2019-07-17 RX ORDER — SODIUM CHLORIDE 9 MG/ML
1000 INJECTION, SOLUTION INTRAVENOUS
Refills: 0 | Status: DISCONTINUED | OUTPATIENT
Start: 2019-07-17 | End: 2019-08-01

## 2019-07-17 RX ORDER — CALCITRIOL 0.5 UG/1
1 CAPSULE ORAL
Qty: 0 | Refills: 0 | DISCHARGE

## 2019-07-17 RX ORDER — FERROUS SULFATE 325(65) MG
1 TABLET ORAL
Qty: 0 | Refills: 0 | DISCHARGE

## 2019-07-17 RX ORDER — HYDROMORPHONE HYDROCHLORIDE 2 MG/ML
0.25 INJECTION INTRAMUSCULAR; INTRAVENOUS; SUBCUTANEOUS
Refills: 0 | Status: DISCONTINUED | OUTPATIENT
Start: 2019-07-17 | End: 2019-07-17

## 2019-07-17 RX ORDER — GLUCAGON INJECTION, SOLUTION 0.5 MG/.1ML
1 INJECTION, SOLUTION SUBCUTANEOUS ONCE
Refills: 0 | Status: DISCONTINUED | OUTPATIENT
Start: 2019-07-17 | End: 2019-08-01

## 2019-07-17 RX ORDER — ATORVASTATIN CALCIUM 80 MG/1
1 TABLET, FILM COATED ORAL
Qty: 0 | Refills: 0 | DISCHARGE

## 2019-07-17 RX ORDER — DEXTROSE 50 % IN WATER 50 %
12.5 SYRINGE (ML) INTRAVENOUS ONCE
Refills: 0 | Status: DISCONTINUED | OUTPATIENT
Start: 2019-07-17 | End: 2019-08-01

## 2019-07-17 RX ORDER — LABETALOL HCL 100 MG
1 TABLET ORAL
Qty: 0 | Refills: 0 | DISCHARGE

## 2019-07-17 RX ADMIN — Medication 1000 MILLIGRAM(S): at 13:30

## 2019-07-17 RX ADMIN — Medication 400 MILLIGRAM(S): at 13:00

## 2019-07-17 NOTE — ASU DISCHARGE PLAN (ADULT/PEDIATRIC) - CARE PROVIDER_API CALL
Lul Stoner)  Vascular Surgery  [address], [address]  [place] [address]  Phone: [phone]  Fax: [phone]  Follow Up Time: 2 weeks

## 2019-07-17 NOTE — ASU DISCHARGE PLAN (ADULT/PEDIATRIC) - CALL YOUR DOCTOR IF YOU HAVE ANY OF THE FOLLOWING:
Pain not relieved by Medications/Bleeding that does not stop/Swelling that gets worse/Numbness, tingling, color or temperature change to extremity

## 2019-08-01 ENCOUNTER — APPOINTMENT (OUTPATIENT)
Dept: VASCULAR SURGERY | Facility: CLINIC | Age: 59
End: 2019-08-01

## 2019-08-12 PROBLEM — E66.9 OBESITY, UNSPECIFIED: Chronic | Status: ACTIVE | Noted: 2019-07-10

## 2019-08-12 PROBLEM — N18.6 END STAGE RENAL DISEASE: Chronic | Status: ACTIVE | Noted: 2019-07-10

## 2019-08-12 PROBLEM — G47.33 OBSTRUCTIVE SLEEP APNEA (ADULT) (PEDIATRIC): Chronic | Status: ACTIVE | Noted: 2019-07-10

## 2019-08-15 ENCOUNTER — APPOINTMENT (OUTPATIENT)
Dept: VASCULAR SURGERY | Facility: CLINIC | Age: 59
End: 2019-08-15
Payer: MEDICAID

## 2019-08-15 VITALS
HEART RATE: 78 BPM | BODY MASS INDEX: 40.02 KG/M2 | TEMPERATURE: 98.1 F | WEIGHT: 212 LBS | SYSTOLIC BLOOD PRESSURE: 137 MMHG | DIASTOLIC BLOOD PRESSURE: 84 MMHG | HEIGHT: 61 IN

## 2019-08-15 PROCEDURE — 93990 DOPPLER FLOW TESTING: CPT

## 2019-08-15 PROCEDURE — 99024 POSTOP FOLLOW-UP VISIT: CPT

## 2019-08-20 NOTE — PHYSICAL EXAM
[Normal Breath Sounds] : Normal breath sounds [Normal Rate and Rhythm] : normal rate and rhythm [2+] : left 2+ [FreeTextEntry1] : Excellent thrill in fistula

## 2019-08-20 NOTE — REASON FOR VISIT
[de-identified] : Radiocephalic AV fistula  [de-identified] : Status post left radiocephalic AV fistula. Patient not on hemodialysis. No complaints.

## 2019-08-20 NOTE — REASON FOR VISIT
[de-identified] : Radiocephalic AV fistula  [de-identified] : Status post left radiocephalic AV fistula. Patient not on hemodialysis. No complaints.

## 2019-08-20 NOTE — DISCUSSION/SUMMARY
[FreeTextEntry1] : Patient underwent a duplex study which shows functioning fistula without stenosis. Follow up in one month

## 2019-08-30 NOTE — PROGRESS NOTE ADULT - PROBLEM/PLAN-3
DISPLAY PLAN FREE TEXT
Hypothyroidism
Type 2 diabetes mellitus

## 2019-09-20 ENCOUNTER — APPOINTMENT (OUTPATIENT)
Dept: VASCULAR SURGERY | Facility: CLINIC | Age: 59
End: 2019-09-20
Payer: MEDICAID

## 2019-09-20 PROCEDURE — 93990 DOPPLER FLOW TESTING: CPT

## 2019-09-20 PROCEDURE — 99024 POSTOP FOLLOW-UP VISIT: CPT

## 2019-10-03 ENCOUNTER — FORM ENCOUNTER (OUTPATIENT)
Age: 59
End: 2019-10-03

## 2019-10-04 ENCOUNTER — APPOINTMENT (OUTPATIENT)
Dept: ENDOVASCULAR SURGERY | Facility: CLINIC | Age: 59
End: 2019-10-04
Payer: MEDICAID

## 2019-10-04 VITALS
DIASTOLIC BLOOD PRESSURE: 75 MMHG | BODY MASS INDEX: 38.14 KG/M2 | HEART RATE: 77 BPM | RESPIRATION RATE: 15 BRPM | HEIGHT: 61 IN | WEIGHT: 202 LBS | SYSTOLIC BLOOD PRESSURE: 131 MMHG | TEMPERATURE: 98 F | OXYGEN SATURATION: 98 %

## 2019-10-04 PROCEDURE — 36902Z: CUSTOM | Mod: 58

## 2019-10-04 PROCEDURE — 36909Z: CUSTOM

## 2019-10-04 PROCEDURE — 36215Z: CUSTOM | Mod: 58

## 2019-10-04 PROCEDURE — 36011Z: CUSTOM | Mod: 59

## 2019-10-04 RX ORDER — OMEPRAZOLE 40 MG/1
40 CAPSULE, DELAYED RELEASE ORAL
Refills: 0 | Status: ACTIVE | COMMUNITY

## 2019-10-04 RX ORDER — ATORVASTATIN CALCIUM 20 MG/1
20 TABLET, FILM COATED ORAL
Refills: 0 | Status: ACTIVE | COMMUNITY

## 2019-10-04 RX ORDER — LABETALOL HYDROCHLORIDE 300 MG/1
TABLET, FILM COATED ORAL
Refills: 0 | Status: ACTIVE | COMMUNITY

## 2019-10-04 RX ORDER — CALCIUM ACETATE 667 MG/1
667 CAPSULE ORAL
Refills: 0 | Status: ACTIVE | COMMUNITY

## 2019-10-11 NOTE — HISTORY OF PRESENT ILLNESS
[] : left radiocephalic fistula [FreeTextEntry1] : 7/17/2019 Dr Stoner [FreeTextEntry6] : Dr Gallagher [FreeTextEntry4] : yesterday [FreeTextEntry5] : yesterday at 9 pm

## 2019-10-11 NOTE — PAST MEDICAL HISTORY
[Increasing age ( >40 years old)] : Increasing age ( >40 years old) [No therapy indicated for cases scheduled for less than one hour] : No therapy indicated for cases scheduled for less than one hour. [FreeTextEntry1] : Malignant Hyperthermia Screening Tool and Risk of Bleeding Assessment \par \par Ms. LINDA SMITH denies family of unexpected death following Anesthesia or Exercise.\par Denies Family history of Malignant Hyperthermia, Muscle or Neuromuscular disorder and High Temperature  following exercise.\par \par Ms. LINDA SMITH denies history of Muscle Spasm, Dark or Chocolate- Colored and Unanticipated fever immediately following anaesthesia or serious exercise.\par Ms. LINDA SMITH also denies bleeding tendencies/Risks of Bleeding.\par

## 2019-10-17 ENCOUNTER — FORM ENCOUNTER (OUTPATIENT)
Age: 59
End: 2019-10-17

## 2019-10-18 ENCOUNTER — APPOINTMENT (OUTPATIENT)
Dept: ENDOVASCULAR SURGERY | Facility: CLINIC | Age: 59
End: 2019-10-18
Payer: MEDICAID

## 2019-10-18 VITALS
WEIGHT: 200 LBS | SYSTOLIC BLOOD PRESSURE: 150 MMHG | HEART RATE: 78 BPM | DIASTOLIC BLOOD PRESSURE: 89 MMHG | BODY MASS INDEX: 37.76 KG/M2 | HEIGHT: 61 IN | OXYGEN SATURATION: 97 % | RESPIRATION RATE: 15 BRPM | TEMPERATURE: 97.6 F

## 2019-10-18 PROCEDURE — 36902Z: CUSTOM | Mod: 59

## 2019-10-18 PROCEDURE — 36011Z: CUSTOM | Mod: 59

## 2019-10-18 PROCEDURE — 36215Z: CUSTOM | Mod: 59

## 2019-10-18 PROCEDURE — 36909Z: CUSTOM

## 2019-10-22 NOTE — HISTORY OF PRESENT ILLNESS
[] : left radiocephalic fistula [FreeTextEntry1] : 7/17/2019 Dr Stoner [FreeTextEntry4] : yesterday [FreeTextEntry5] : today 6 am [FreeTextEntry6] : Dr Gallagher

## 2019-11-19 ENCOUNTER — APPOINTMENT (OUTPATIENT)
Dept: VASCULAR SURGERY | Facility: CLINIC | Age: 59
End: 2019-11-19
Payer: MEDICAID

## 2019-11-19 VITALS
DIASTOLIC BLOOD PRESSURE: 90 MMHG | HEART RATE: 80 BPM | HEIGHT: 61 IN | SYSTOLIC BLOOD PRESSURE: 156 MMHG | BODY MASS INDEX: 37.76 KG/M2 | TEMPERATURE: 97.9 F | WEIGHT: 200 LBS

## 2019-11-19 PROCEDURE — 99213 OFFICE O/P EST LOW 20 MIN: CPT

## 2019-11-19 PROCEDURE — 93922 UPR/L XTREMITY ART 2 LEVELS: CPT

## 2019-11-19 NOTE — PHYSICAL EXAM
[Thrill] : thrill [Normal] : normal rate, regular rhythm, normal S1/S2, no murmur [Pulsatile Thrill] : no pulsatile thrill [Warm Extremities] : warm extremities [Hand well perfused] : hand well perfused [2+] : left 2+

## 2019-11-19 NOTE — REASON FOR VISIT
[Follow-Up Visit] : a follow-up visit for [Fistula Creation] : fistula creation [Non-Maturing Fistula] : non-maturing fistula

## 2019-11-19 NOTE — DISCUSSION/SUMMARY
[FreeTextEntry1] : Patient underwent a left arm steal study which shows no evidence of steal syndrome at this time. The fistula may be used. Will schedule catheter removal

## 2019-12-10 ENCOUNTER — APPOINTMENT (OUTPATIENT)
Dept: ENDOVASCULAR SURGERY | Facility: CLINIC | Age: 59
End: 2019-12-10
Payer: MEDICAID

## 2019-12-17 ENCOUNTER — FORM ENCOUNTER (OUTPATIENT)
Age: 59
End: 2019-12-17

## 2019-12-18 ENCOUNTER — APPOINTMENT (OUTPATIENT)
Dept: ENDOVASCULAR SURGERY | Facility: CLINIC | Age: 59
End: 2019-12-18
Payer: MEDICAID

## 2019-12-18 VITALS
HEIGHT: 61 IN | DIASTOLIC BLOOD PRESSURE: 80 MMHG | WEIGHT: 200 LBS | TEMPERATURE: 97.8 F | SYSTOLIC BLOOD PRESSURE: 140 MMHG | RESPIRATION RATE: 97 BRPM | BODY MASS INDEX: 37.76 KG/M2 | HEART RATE: 81 BPM

## 2019-12-18 PROCEDURE — 36216Z: CUSTOM | Mod: 59

## 2019-12-18 PROCEDURE — 36902Z: CUSTOM | Mod: 59

## 2019-12-18 PROCEDURE — 36011Z: CUSTOM | Mod: 59

## 2019-12-18 PROCEDURE — 37242Z: CUSTOM

## 2019-12-18 PROCEDURE — 36909Z: CUSTOM | Mod: 59

## 2019-12-20 NOTE — PAST MEDICAL HISTORY
[FreeTextEntry1] : Malignant Hyperthermia Screening Tool and Risk of Bleeding Assessment \par \par Ms. LINDA SMITH denies family of unexpected death following Anesthesia or Exercise.\par Denies Family history of Malignant Hyperthermia, Muscle or Neuromuscular disorder and High Temperature  following exercise.\par \par Ms. LINDA SMITH denies history of Muscle Spasm, Dark or Chocolate- Colored and Unanticipated fever immediately following anaesthesia or serious exercise.\par Ms. LINDA SMITH also denies bleeding tendencies/Risks of Bleeding.\par

## 2019-12-20 NOTE — HISTORY OF PRESENT ILLNESS
[FreeTextEntry1] : 7/17/2019 Dr Stoner [FreeTextEntry4] : yesterday [FreeTextEntry5] : 12/17/2019  [FreeTextEntry6] : Dr Gallagher

## 2019-12-26 ENCOUNTER — FORM ENCOUNTER (OUTPATIENT)
Age: 59
End: 2019-12-26

## 2019-12-27 ENCOUNTER — APPOINTMENT (OUTPATIENT)
Dept: ENDOVASCULAR SURGERY | Facility: CLINIC | Age: 59
End: 2019-12-27
Payer: MEDICAID

## 2019-12-27 VITALS
OXYGEN SATURATION: 100 % | SYSTOLIC BLOOD PRESSURE: 157 MMHG | WEIGHT: 211 LBS | BODY MASS INDEX: 39.87 KG/M2 | DIASTOLIC BLOOD PRESSURE: 84 MMHG | HEART RATE: 73 BPM | RESPIRATION RATE: 18 BRPM | TEMPERATURE: 98.5 F

## 2019-12-27 PROCEDURE — 36832 AV FISTULA REVISION OPEN: CPT | Mod: LT

## 2019-12-27 NOTE — PAST MEDICAL HISTORY
[Increasing age ( >40 years old)] : Increasing age ( >40 years old) [No therapy indicated for cases scheduled for less than one hour] : No therapy indicated for cases scheduled for less than one hour. [Obesity: BMI >25] : Obesity: BMI >25 [FreeTextEntry1] : Malignant Hyperthermia Screening Tool and Risk of Bleeding Assessment \par \par Ms. LINDA SMITH denies family of unexpected death following Anesthesia or Exercise.\par Denies Family history of Malignant Hyperthermia, Muscle or Neuromuscular disorder and High Temperature  following exercise.\par \par Ms. LINDA SMITH denies history of Muscle Spasm, Dark or Chocolate- Colored and Unanticipated fever immediately following anaesthesia or serious exercise.\par Ms. LINDA SMITH also denies bleeding tendencies/Risks of Bleeding.\par

## 2019-12-27 NOTE — REASON FOR VISIT
[Other ___] : a [unfilled] visit for [Difficult Cannulation] : difficult cannulation [Formal Caregiver] : formal caregiver [FreeTextEntry2] : left arm liposuction

## 2019-12-27 NOTE — PROCEDURE
[Site check for bleeding/hematoma/thrill/bruit] : Site check for bleeding/hematoma/thrill/bruit [Resume diet] : resume diet [Vital signs on admission the q 15 mins x2] : Vital signs on admission the q 15 mins x2 [FreeTextEntry1] : left arm fistula/liposuction

## 2019-12-27 NOTE — HISTORY OF PRESENT ILLNESS
[] : left radiocephalic fistula [FreeTextEntry1] : alert and oriented x 3 \par accompanied by aide\par no reported falls \par took blood pressure meds \par c/o numbness and pain left hand [FreeTextEntry5] : yesterday at 7pm [FreeTextEntry4] : yesterday via tunneled catheter [FreeTextEntry6] : Dr Mora

## 2020-01-01 NOTE — PROGRESS NOTE ADULT - PROBLEM SELECTOR PLAN 2
Can use infant glycerine suppository or 1/2 of pediatric size when uncomfortable/not eating/straining.  Try to limit to 1-2 times per month.    Liberalize the volumes slightly, want there to be a ittle left in the bottle most of the time.      Patient Education    BRIGHT FUTURES HANDOUT- PARENT  1 MONTH VISIT  Here are some suggestions from [employer] experts that may be of value to your family.     HOW YOUR FAMILY IS DOING  If you are worried about your living or food situation, talk with us. Community agencies and programs such as WIC and [employer] can also provide information and assistance.  Ask us for help if you have been hurt by your partner or another important person in your life. Hotlines and community agencies can also provide confidential help.  Tobacco-free spaces keep children healthy. Don t smoke or use e-cigarettes. Keep your home and car smoke-free.  Don t use alcohol or drugs.  Check your home for mold and radon. Avoid using pesticides.    FEEDING YOUR BABY  Feed your baby only breast milk or iron-fortified formula until she is about 6 months old.  Avoid feeding your baby solid foods, juice, and water until she is about 6 months old.  Feed your baby when she is hungry. Look for her to  Put her hand to her mouth.  Suck or root.  Fuss.  Stop feeding when you see your baby is full. You can tell when she  Turns away  Closes her mouth  Relaxes her arms and hands  Know that your baby is getting enough to eat if she has more than 5 wet diapers and at least 3 soft stools each day and is gaining weight appropriately.  Burp your baby during natural feeding breaks.  Hold your baby so you can look at each other when you feed her.  Always hold the bottle. Never prop it.  If Breastfeeding  Feed your baby on demand generally every 1 to 3 hours during the day and every 3 hours at night.  Give your baby vitamin D drops (400 IU a day).  Continue to take your prenatal vitamin with iron.  Eat a healthy diet.  If  Formula Feeding  Always prepare, heat, and store formula safely. If you need help, ask us.  Feed your baby 24 to 27 oz of formula a day. If your baby is still hungry, you can feed her more.    HOW YOU ARE FEELING  Take care of yourself so you have the energy to care for your baby. Remember to go for your post-birth checkup.  If you feel sad or very tired for more than a few days, let us know or call someone you trust for help.  Find time for yourself and your partner.    CARING FOR YOUR BABY  Hold and cuddle your baby often.  Enjoy playtime with your baby. Put him on his tummy for a few minutes at a time when he is awake.  Never leave him alone on his tummy or use tummy time for sleep.  When your baby is crying, comfort him by talking to, patting, stroking, and rocking him. Consider offering him a pacifier.  Never hit or shake your baby.  Take his temperature rectally, not by ear or skin. A fever is a rectal temperature of 100.4 F/38.0 C or higher. Call our office if you have any questions or concerns.  Wash your hands often.    SAFETY  Use a rear-facing-only car safety seat in the back seat of all vehicles.  Never put your baby in the front seat of a vehicle that has a passenger airbag.  Make sure your baby always stays in her car safety seat during travel. If she becomes fussy or needs to feed, stop the vehicle and take her out of her seat.  Your baby s safety depends on you. Always wear your lap and shoulder seat belt. Never drive after drinking alcohol or using drugs. Never text or use a cell phone while driving.  Always put your baby to sleep on her back in her own crib, not in your bed.  Your baby should sleep in your room until she is at least 6 months old.  Make sure your baby s crib or sleep surface meets the most recent safety guidelines.  Don t put soft objects and loose bedding such as blankets, pillows, bumper pads, and toys in the crib.  If you choose to use a mesh playpen, get one made after February  28, 2013.  Keep hanging cords or strings away from your baby. Don t let your baby wear necklaces or bracelets.  Always keep a hand on your baby when changing diapers or clothing on a changing table, couch, or bed.  Learn infant CPR. Know emergency numbers. Prepare for disasters or other unexpected events by having an emergency plan.    WHAT TO EXPECT AT YOUR BABY S 2 MONTH VISIT  We will talk about  Taking care of your baby, your family, and yourself  Getting back to work or school and finding   Getting to know your baby  Feeding your baby  Keeping your baby safe at home and in the car        Helpful Resources: Smoking Quit Line: 885.699.8066  Poison Help Line:  878.160.1963  Information About Car Safety Seats: www.safercar.gov/parents  Toll-free Auto Safety Hotline: 682.577.4503  Consistent with Bright Futures: Guidelines for Health Supervision of Infants, Children, and Adolescents, 4th Edition  For more information, go to https://brightfutures.aap.org.            r/o ACS; Resolved after Nitro x3 doses; No acute EKG changes;   -Cardiology c/s in AM for possible ischemic evaluation given risk factors (after acute asthma exacerbation resolves)  -c/w Asa ppx  -A1c, TSH, Lipid panel r/o ACS; Resolved after Nitro x3 doses; No acute EKG changes;   -Cardiology c/s in AM for possible ischemic evaluation given risk factors (after acute asthma exacerbation resolves)  -check TTE

## 2020-01-17 ENCOUNTER — APPOINTMENT (OUTPATIENT)
Dept: VASCULAR SURGERY | Facility: CLINIC | Age: 60
End: 2020-01-17
Payer: MEDICAID

## 2020-01-17 PROCEDURE — 99213 OFFICE O/P EST LOW 20 MIN: CPT

## 2020-01-17 PROCEDURE — 93990 DOPPLER FLOW TESTING: CPT

## 2020-01-24 NOTE — PHYSICAL EXAM
[Aneurysm] : no aneurysm [Thrill] : thrill [Hand well perfused] : hand well perfused [Bleeding] : no bleeding [Normal] : coordination grossly intact, no focal deficits

## 2020-01-24 NOTE — ASSESSMENT
[FreeTextEntry1] : Status post liposuction of the left forearm to decrease the depth of radiocephalic fistula. The fistula feels stronger. Left arm fistula can be accessed for hemodialysis. Followup for catheter removal.

## 2020-02-05 ENCOUNTER — APPOINTMENT (OUTPATIENT)
Dept: ENDOVASCULAR SURGERY | Facility: CLINIC | Age: 60
End: 2020-02-05
Payer: MEDICAID

## 2020-02-05 PROCEDURE — 36589 REMOVAL TUNNELED CV CATH: CPT

## 2020-03-27 ENCOUNTER — APPOINTMENT (OUTPATIENT)
Dept: VASCULAR SURGERY | Facility: CLINIC | Age: 60
End: 2020-03-27

## 2020-08-10 ENCOUNTER — APPOINTMENT (OUTPATIENT)
Dept: VASCULAR SURGERY | Facility: CLINIC | Age: 60
End: 2020-08-10
Payer: MEDICAID

## 2020-08-10 VITALS — TEMPERATURE: 97.3 F

## 2020-08-10 PROCEDURE — 93990 DOPPLER FLOW TESTING: CPT | Mod: 59

## 2020-08-10 PROCEDURE — 93923 UPR/LXTR ART STDY 3+ LVLS: CPT

## 2020-08-10 PROCEDURE — 99213 OFFICE O/P EST LOW 20 MIN: CPT

## 2020-08-10 RX ORDER — SYRINGE AND NEEDLE,INSULIN,1ML 28GX1/2"
30G X 1/2" SYRINGE, EMPTY DISPOSABLE MISCELLANEOUS
Qty: 100 | Refills: 0 | Status: DISCONTINUED | COMMUNITY
Start: 2020-01-20

## 2020-08-10 RX ORDER — LIDOCAINE AND PRILOCAINE 25; 25 MG/G; MG/G
2.5-2.5 CREAM TOPICAL
Qty: 30 | Refills: 0 | Status: DISCONTINUED | COMMUNITY
Start: 2020-06-08

## 2020-08-10 RX ORDER — PEN NEEDLE, DIABETIC 29 G X1/2"
32G X 4 MM NEEDLE, DISPOSABLE MISCELLANEOUS
Qty: 100 | Refills: 0 | Status: DISCONTINUED | COMMUNITY
Start: 2020-07-21

## 2020-08-10 RX ORDER — ALBUTEROL SULFATE 2.5 MG/3ML
(2.5 MG/3ML) SOLUTION RESPIRATORY (INHALATION)
Qty: 300 | Refills: 0 | Status: DISCONTINUED | COMMUNITY
Start: 2020-04-24

## 2020-08-10 RX ORDER — INSULIN HUMAN 100 [IU]/ML
100 INJECTION, SOLUTION PARENTERAL
Qty: 10 | Refills: 0 | Status: DISCONTINUED | COMMUNITY
Start: 2020-06-22

## 2020-08-10 RX ORDER — GABAPENTIN 100 MG/1
100 CAPSULE ORAL
Qty: 30 | Refills: 0 | Status: DISCONTINUED | COMMUNITY
Start: 2020-07-22

## 2020-08-10 RX ORDER — ACETAMINOPHEN AND CODEINE 300; 30 MG/1; MG/1
300-30 TABLET ORAL
Qty: 60 | Refills: 0 | Status: DISCONTINUED | COMMUNITY
Start: 2020-06-08

## 2020-08-10 RX ORDER — LANCETS
EACH MISCELLANEOUS
Qty: 100 | Refills: 0 | Status: DISCONTINUED | COMMUNITY
Start: 2020-07-21

## 2020-08-10 RX ORDER — PANTOPRAZOLE 40 MG/1
40 TABLET, DELAYED RELEASE ORAL
Qty: 30 | Refills: 0 | Status: DISCONTINUED | COMMUNITY
Start: 2020-06-22

## 2020-08-10 RX ORDER — ISOPROPYL ALCOHOL 0.75 G/1
SWAB TOPICAL
Qty: 100 | Refills: 0 | Status: DISCONTINUED | COMMUNITY
Start: 2020-06-22

## 2020-08-10 RX ORDER — CALCITRIOL 0.5 UG/1
0.5 CAPSULE, LIQUID FILLED ORAL
Qty: 30 | Refills: 0 | Status: DISCONTINUED | COMMUNITY
Start: 2020-06-22

## 2020-08-10 RX ORDER — SUCRALFATE 1 G/1
1 TABLET ORAL
Qty: 45 | Refills: 0 | Status: DISCONTINUED | COMMUNITY
Start: 2020-04-18

## 2020-08-10 RX ORDER — FUROSEMIDE 80 MG/1
80 TABLET ORAL
Qty: 60 | Refills: 0 | Status: DISCONTINUED | COMMUNITY
Start: 2020-03-06

## 2020-08-10 RX ORDER — ASPIRIN 81 MG/1
81 TABLET, COATED ORAL
Qty: 30 | Refills: 0 | Status: DISCONTINUED | COMMUNITY
Start: 2020-06-22

## 2020-08-10 RX ORDER — ALBUTEROL SULFATE 90 UG/1
108 (90 BASE) INHALANT RESPIRATORY (INHALATION)
Qty: 18 | Refills: 0 | Status: DISCONTINUED | COMMUNITY
Start: 2020-05-12

## 2020-08-10 RX ORDER — HYDROXYZINE HYDROCHLORIDE 10 MG/1
10 TABLET ORAL
Qty: 30 | Refills: 0 | Status: DISCONTINUED | COMMUNITY
Start: 2020-05-04

## 2020-08-10 RX ORDER — BLOOD SUGAR DIAGNOSTIC
STRIP MISCELLANEOUS
Qty: 50 | Refills: 0 | Status: DISCONTINUED | COMMUNITY
Start: 2020-07-17

## 2020-08-10 RX ORDER — ACETAMINOPHEN 500 MG/1
500 TABLET ORAL
Qty: 120 | Refills: 0 | Status: DISCONTINUED | COMMUNITY
Start: 2020-07-22

## 2020-08-10 RX ORDER — INSULIN HUMAN 500 [IU]/ML
500 INJECTION, SOLUTION SUBCUTANEOUS
Qty: 3 | Refills: 0 | Status: DISCONTINUED | COMMUNITY
Start: 2020-07-22

## 2020-08-10 NOTE — HISTORY OF PRESENT ILLNESS
[FreeTextEntry1] : Patient with renal failure on hemodialysis.  Patient has a left radiocephalic fistula.  Patient previously had a left radial artery coil embolization for treatment of steal syndrome.  Patient's symptoms improved for a few months but the patient has developed recurrent symptoms over the past few weeks.  Patient complains of left hand pain especially in the tips of the fingers. [] : left radiocephalic fistula

## 2020-08-10 NOTE — ASSESSMENT
[FreeTextEntry1] : Patient with renal failure on hemodialysis.  Patient with symptoms of steal syndrome.  No significant steal was demonstrated on arterial Doppler study.  Plan for left arm angiogram to further evaluate.

## 2020-08-10 NOTE — PHYSICAL EXAM
[Thrill] : thrill [Bleeding] : no bleeding [Aneurysm] : no aneurysm [Normal] : normoactive bowel sounds, soft and nontender, no hepatosplenomegaly or masses appreciated [2+] : left 2+

## 2020-08-23 ENCOUNTER — APPOINTMENT (OUTPATIENT)
Dept: DISASTER EMERGENCY | Facility: CLINIC | Age: 60
End: 2020-08-23

## 2020-08-25 PROBLEM — T82.898A INADEQUATE FLOW OF DIALYSIS ARTERIOVENOUS FISTULA: Status: ACTIVE | Noted: 2019-12-18

## 2020-08-25 PROBLEM — N18.6 END STAGE RENAL DISEASE: Status: ACTIVE | Noted: 2019-06-27

## 2020-08-26 ENCOUNTER — APPOINTMENT (OUTPATIENT)
Dept: ENDOVASCULAR SURGERY | Facility: CLINIC | Age: 60
End: 2020-08-26

## 2020-08-26 DIAGNOSIS — N18.6 END STAGE RENAL DISEASE: ICD-10-CM

## 2020-08-26 DIAGNOSIS — T82.898A OTHER SPECIFIED COMPLICATION OF VASCULAR PROSTHETIC DEVICES, IMPLANTS AND GRAFTS, INITIAL ENCOUNTER: ICD-10-CM

## 2020-09-08 NOTE — HISTORY OF PRESENT ILLNESS
[FreeTextEntry1] : alert and oriented x 3 \par accompanied by aide\par no reported falls \par took blood pressure meds \par c/o numbness and pain left hand [FreeTextEntry4] : yesterday [FreeTextEntry6] : Dr Gallagher [FreeTextEntry5] : 12/17/2019

## 2020-09-09 ENCOUNTER — APPOINTMENT (OUTPATIENT)
Dept: ENDOVASCULAR SURGERY | Facility: CLINIC | Age: 60
End: 2020-09-09

## 2023-03-27 NOTE — H&P PST ADULT - BMI (KG/M2)
Received request via: Pharmacy    Was the patient seen in the last year in this department? Yes 07/11/22    Does the patient have an active prescription (recently filled or refills available) for medication(s) requested?   No  Does the patient have FDC Plus and need 100 day supply (blood pressure, diabetes and cholesterol meds only)? Patient does not have SCP  
40.4

## 2023-10-05 NOTE — PROCEDURE
[Resume diet] : resume diet [Site check for bleeding/hematoma/thrill/bruit] : Site check for bleeding/hematoma/thrill/bruit [Vital signs on admission the q 15 mins x2] : Vital signs on admission the q 15 mins x2 [FreeTextEntry1] : left arm fistula/fistulogram/angioplasty/coil Ear Star Wedge Flap Text: The defect edges were debeveled with a #15 blade scalpel.  Given the location of the defect and the proximity to free margins (helical rim) an ear star wedge flap was deemed most appropriate.  Using a sterile surgical marker, the appropriate flap was drawn incorporating the defect and placing the expected incisions between the helical rim and antihelix where possible.  The area thus outlined was incised through and through with a #15 scalpel blade.
